# Patient Record
Sex: MALE | Race: WHITE | Employment: OTHER | URBAN - METROPOLITAN AREA
[De-identification: names, ages, dates, MRNs, and addresses within clinical notes are randomized per-mention and may not be internally consistent; named-entity substitution may affect disease eponyms.]

---

## 2017-02-16 RX ORDER — SIMVASTATIN 10 MG/1
TABLET, FILM COATED ORAL
Qty: 30 TAB | Refills: 10 | Status: SHIPPED | OUTPATIENT
Start: 2017-02-16 | End: 2018-02-20 | Stop reason: SDUPTHER

## 2017-03-20 ENCOUNTER — OFFICE VISIT (OUTPATIENT)
Dept: CARDIOLOGY CLINIC | Age: 82
End: 2017-03-20

## 2017-03-20 ENCOUNTER — HOSPITAL ENCOUNTER (OUTPATIENT)
Dept: LAB | Age: 82
Discharge: HOME | End: 2017-03-20
Payer: MEDICARE

## 2017-03-20 VITALS
OXYGEN SATURATION: 97 % | SYSTOLIC BLOOD PRESSURE: 150 MMHG | WEIGHT: 168.4 LBS | RESPIRATION RATE: 16 BRPM | HEIGHT: 68 IN | HEART RATE: 85 BPM | DIASTOLIC BLOOD PRESSURE: 80 MMHG | BODY MASS INDEX: 25.52 KG/M2

## 2017-03-20 DIAGNOSIS — R06.02 SHORTNESS OF BREATH: ICD-10-CM

## 2017-03-20 DIAGNOSIS — I25.10 CORONARY ARTERY DISEASE INVOLVING NATIVE CORONARY ARTERY OF NATIVE HEART WITHOUT ANGINA PECTORIS: Primary | ICD-10-CM

## 2017-03-20 DIAGNOSIS — J44.9 CHRONIC OBSTRUCTIVE PULMONARY DISEASE, UNSPECIFIED COPD TYPE (HCC): ICD-10-CM

## 2017-03-20 DIAGNOSIS — Z01.810 PREOP CARDIOVASCULAR EXAM: ICD-10-CM

## 2017-03-20 DIAGNOSIS — E78.2 MIXED HYPERLIPIDEMIA: ICD-10-CM

## 2017-03-20 PROCEDURE — 36415 COLL VENOUS BLD VENIPUNCTURE: CPT

## 2017-03-20 PROCEDURE — 80053 COMPREHEN METABOLIC PANEL: CPT

## 2017-03-20 PROCEDURE — 85730 THROMBOPLASTIN TIME PARTIAL: CPT

## 2017-03-20 PROCEDURE — 85610 PROTHROMBIN TIME: CPT

## 2017-03-20 PROCEDURE — 85027 COMPLETE CBC AUTOMATED: CPT

## 2017-03-20 PROCEDURE — 80061 LIPID PANEL: CPT

## 2017-03-20 NOTE — LETTER
3/21/2017 10:25 AM 
 
Patient:  Bonnie Alexander YOB: 1931 Date of Visit: 3/20/2017 Dear Nae Mueller MD 
[facility] [address] [facility] 7 [address] VIA Facsimile: [phone] Lady Padma MD 
[address] [facility] [address] VIA In Basket 
 : 
Mr. Abhijit Vega is a 81 yo M with CAD s/p cath on 3/19/10 with multivessel CAD, then 5 vessel CABG on 3/23/10 (LIMA to LAD, SVG to D1, OM2, OM3, and SVG to RCA), EF 60% by echo 2010, hyperlipidemia (intolerance to lipitor) on crestor, asthma, DJD of lumbar spine, sciatica on right side followed by orthopedics, Dr. Chance Duffy. Since his last visit, he has been doing well here. He is here for preop cardiac evaluation prior to surgery on his right hip on 03/27/2017. Since his last visit, he has been doing well. He denies any chest pain. He has some baseline shortness of breath unchanged, which he attributes to his COPD. No palpitations. No syncope. His echocardiogram last year demonstrated normal systolic function and mild aortic regurgitation. He is compensated on exam with clear lungs and no lower extremity edema. 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  
Assessment and Plan: 1. Preop cardiac evaluation. He is stable and compensated from a cardiac standpoint and low risk for cardiac complications. No additional cardiac evaluation is indicated at this time. He can hold his aspirin as needed. 2. Coronary artery disease, status post bypass. Continue beta-blocker, statin and aspirin. Will obtain blood work today. He is not on an ACE inhibitor, but could consider this in the future, depending on what his blood pressure is. 3. Essential hypertension. Blood pressure is overall controlled. He has some white coat hypertension. 4. Mixed hyperlipidemia. Muscle aches with Lipitor and Crestor. Doing well on Zocor. 5. Dizziness. It has been intermittent.   Workup has been unrevealing including neurology with MRI and ENT with Dr. Mendoza Estimable. Dr. Tiffany Chavis is going to do his hip surgery. If you have questions, please do not hesitate to call me. Sincerely, Raciel Kaiser MD

## 2017-03-20 NOTE — MR AVS SNAPSHOT
Visit Information Date & Time Provider Department Dept. Phone Encounter #  
 3/20/2017  9:20 AM Louvella Goldmann, MD [facility] [phone] [id number] Your Appointments 4/20/2017 10:00 AM  
ESTABLISHED PATIENT with Louvella Goldmann, MD  
[facility] ([address]) Appt Note: 6 month follow up  
 [address]  
[address] [facility] 7 [address] Upcoming Health Maintenance Date Due DTaP/Tdap/Td series (1 - Tdap) 1/5/1952 ZOSTER VACCINE AGE 60> 1/5/1991 GLAUCOMA SCREENING Q2Y 1/5/1996 MEDICARE YEARLY EXAM 1/5/1996 INFLUENZA AGE 9 TO ADULT 8/1/2016 Allergies as of 3/20/2017  Review Complete On: 3/20/2017 By: Louvella Goldmann, MD  
  
 Severity Noted Reaction Type Reactions Lipitor [Atorvastatin]  06/02/2009    Other (comments) Causes g.i. upset Current Immunizations  Never Reviewed Name Date Influenza Vaccine Whole 9/23/2005 Pneumococcal Vaccine (Unspecified Type) 9/23/2005, 7/1/1999 Not reviewed this visit You Were Diagnosed With   
  
 Codes Comments Coronary artery disease involving native coronary artery of native heart without angina pectoris    -  Primary ICD-10-CM: I25.10 ICD-9-CM: 414.01 Mixed hyperlipidemia     ICD-10-CM: E78.2 ICD-9-CM: 272.2 Shortness of breath     ICD-10-CM: R06.02 
ICD-9-CM: 786.05 Vitals BP Pulse Resp Height(growth percentile) Weight(growth percentile) SpO2  
 150/80 (BP 1 Location: Left arm, BP Patient Position: Sitting) 85 16 5' 8\" (1.727 m) 168 lb 6.4 oz (76.4 kg) 97% BMI Smoking Status 25.61 kg/m2 Never Smoker BMI and BSA Data Body Mass Index Body Surface Area  
 25.61 kg/m 2 1.91 m 2 Preferred Pharmacy Pharmacy Name Phone LAUR[facility] [address] [facility] [place][note 2] [phone] Your Updated Medication List  
  
   
This list is accurate as of: 3/20/17 10:23 AM.  Always use your most recent med list.  
  
  
  
  
 albuterol 90 mcg/actuation inhaler Commonly known as:  PROVENTIL HFA, VENTOLIN HFA, PROAIR HFA Take 1 Puff by inhalation two (2) times daily as needed for Wheezing. aspirin 81 mg chewable tablet Take 1 Tab by mouth daily. QVAR 40 mcg/actuation [employer] Generic drug:  beclomethasone Take 2 Puffs by inhalation two (2) times a day. simvastatin 10 mg tablet Commonly known as:  ZOCOR  
TAKE ONE TABLET BY MOUTH NIGHTLY We Performed the Following AMB POC EKG ROUTINE W/ 12 LEADS, INTER & REP [96910 CPT(R)] CBC W/O DIFF [60428 CPT(R)] LIPID PANEL [26986 CPT(R)] METABOLIC PANEL, COMPREHENSIVE [78667 CPT(R)] PROTHROMBIN TIME + INR [52370 CPT(R)] PTT [id number] CPT(R)] Introducing [facility]! Jessie Cyr introduces [employer] patient portal. Now you can access parts of your medical record, email your doctor's office, and request medication refills online. 1. In your internet browser, go to [email or web address] 2. Click on the First Time User? Click Here link in the Sign In box. You will see the New Member Sign Up page. 3. Enter your [employer] Access Code exactly as it appears below. You will not need to use this code after youve completed the sign-up process. If you do not sign up before the expiration date, you must request a new code. · [employer] Access Code: [email or web address] Expires: 6/18/2017 10:22 AM 
 
4. Enter the last four digits of your Social Security Number (xxxx) and Date of Birth (mm/dd/yyyy) as indicated and click Submit. You will be taken to the next sign-up page. 5. Create a [employer] ID. This will be your [employer] login ID and cannot be changed, so think of one that is secure and easy to remember. 6. Create a [employer] password. You can change your password at any time. 7. Enter your Password Reset Question and Answer. This can be used at a later time if you forget your password. 8. Enter your e-mail address. You will receive e-mail notification when new information is available in [address] 9. Click Sign Up. You can now view and download portions of your medical record. 10. Click the Download Summary menu link to download a portable copy of your medical information. If you have questions, please visit the Frequently Asked Questions section of the [employer] website. Remember, [employer] is NOT to be used for urgent needs. For medical emergencies, dial 911. Now available from your iPhone and Android! Please provide this summary of care documentation to your next provider. Your primary care clinician is listed as Anthony Schrader. If you have any questions after today's visit, please call [phone].

## 2017-03-20 NOTE — PROGRESS NOTES
[address]: Arthur Harrison  [phone]    HPI:  Mr. Agatha Solitario is a 79 yo M with CAD s/p cath on 3/19/10 with multivessel CAD, then 5 vessel CABG on 3/23/10 (LIMA to LAD, SVG to D1, OM2, OM3, and SVG to RCA), EF 60% by echo 2010, hyperlipidemia (intolerance to lipitor) on crestor, asthma, DJD of lumbar spine, sciatica on right side followed by orthopedics, Dr. Anabell Mera. Since his last visit, he has been doing well here. He is here for preop cardiac evaluation prior to surgery on his right hip on 03/27/2017. Since his last visit, he has been doing well. He denies any chest pain. He has some baseline shortness of breath unchanged, which he attributes to his COPD. No palpitations. No syncope. His echocardiogram last year demonstrated normal systolic function and mild aortic regurgitation. He is compensated on exam with clear lungs and no lower extremity edema. 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   Assessment and Plan:   1. Preop cardiac evaluation. He is stable and compensated from a cardiac standpoint and low risk for cardiac complications. No additional cardiac evaluation is indicated at this time. He can hold his aspirin as needed. 2. Coronary artery disease, status post bypass. Continue beta-blocker, statin and aspirin. Will obtain blood work today. He is not on an ACE inhibitor, but could consider this in the future, depending on what his blood pressure is. 3. Essential hypertension. Blood pressure is overall controlled. He has some white coat hypertension. 4. Mixed hyperlipidemia. Muscle aches with Lipitor and Crestor. Doing well on Zocor. 5. Dizziness. It has been intermittent. Workup has been unrevealing including neurology with MRI and ENT with Dr. Anabell Mera. Dr. Priyanka Garber is going to do his hip surgery. He  has a past medical history of CAD (coronary artery disease); Essential hypertension; and Hyperlipidemia. All other systems negative except as above. PE  Vitals:    03/20/17 1000   BP: 150/80   Pulse: 85   Resp: 16   SpO2: 97%   Weight: 168 lb 6.4 oz (76.4 kg)   Height: 5' 8\" (1.727 m)    Body mass index is 25.61 kg/(m^2).    General appearance - alert, well appearing, and in no distress  Mental status - affect appropriate to mood  Neck - supple, no JVD  Chest - clear to auscultation, no wheezes, rales or rhonchi  Heart - normal rate, regular rhythm, normal S1, S2, I-II/VI systolic murmur LUSB  Abdomen - soft, nontender, nondistended  Extremities - peripheral pulses normal, no pedal edema  Recent Labs:  Lab Results   Component Value Date/Time    Cholesterol, total 197 07/29/2016 10:43 AM    HDL Cholesterol 52 07/29/2016 10:43 AM    LDL, calculated 123 07/29/2016 10:43 AM    Triglyceride 110 07/29/2016 10:43 AM    CHOL/HDL Ratio 3.7 09/16/2010 11:40 AM     Lab Results   Component Value Date/Time    Creatinine 0.87 02/22/2016 10:39 AM     Lab Results   Component Value Date/Time    BUN 17 02/22/2016 10:39 AM     Lab Results   Component Value Date/Time    Potassium 4.6 02/22/2016 10:39 AM     Lab Results   Component Value Date/Time    Hemoglobin A1c 5.8 03/20/2010 04:10 AM     Lab Results   Component Value Date/Time    HGB 13.3 12/27/2012 10:36 AM     Lab Results   Component Value Date/Time    PLATELET 302 89/24/9753 10:36 AM       Reviewed:  Past Medical History:   Diagnosis Date    CAD (coronary artery disease)     Essential hypertension     Hyperlipidemia      History   Smoking Status    Never Smoker   Smokeless Tobacco    Never Used     History   Alcohol Use No     Comment: occasional drink at night 2-3 times per week     Allergies   Allergen Reactions    Lipitor [Atorvastatin] Other (comments)     Causes g.i. upset       Current Outpatient Prescriptions   Medication Sig    simvastatin (ZOCOR) 10 mg tablet TAKE ONE TABLET BY MOUTH NIGHTLY    albuterol (PROVENTIL HFA, VENTOLIN HFA, PROAIR HFA) 90 mcg/actuation inhaler Take 1 Puff by inhalation two (2) times daily as needed for Wheezing.  beclomethasone (QVAR) 40 mcg/actuation inhaler Take 2 Puffs by inhalation two (2) times a day.  aspirin 81 mg chewable tablet Take 1 Tab by mouth daily. No current facility-administered medications for this visit.         Radha Ballesteros MD  [facility] [place]  [address] [facility] [address]  [address]

## 2017-03-21 LAB
ALBUMIN SERPL-MCNC: 3.7 G/DL (ref 3.5–4.7)
ALBUMIN/GLOB SERPL: 1.6 {RATIO} (ref 1.2–2.2)
ALP SERPL-CCNC: 49 IU/L (ref 39–117)
ALT SERPL-CCNC: 9 IU/L (ref 0–44)
APTT PPP: 27 SEC (ref 24–33)
AST SERPL-CCNC: 17 IU/L (ref 0–40)
BILIRUB SERPL-MCNC: 0.5 MG/DL (ref 0–1.2)
BUN SERPL-MCNC: 19 MG/DL (ref 8–27)
BUN/CREAT SERPL: 22 (ref 10–22)
CALCIUM SERPL-MCNC: 8.8 MG/DL (ref 8.6–10.2)
CHLORIDE SERPL-SCNC: 102 MMOL/L (ref 96–106)
CHOLEST SERPL-MCNC: 194 MG/DL (ref 100–199)
CO2 SERPL-SCNC: 26 MMOL/L (ref 18–29)
CREAT SERPL-MCNC: 0.87 MG/DL (ref 0.76–1.27)
ERYTHROCYTE [DISTWIDTH] IN BLOOD BY AUTOMATED COUNT: 14.2 % (ref 12.3–15.4)
GLOBULIN SER CALC-MCNC: 2.3 G/DL (ref 1.5–4.5)
GLUCOSE SERPL-MCNC: 98 MG/DL (ref 65–99)
HCT VFR BLD AUTO: 41.9 % (ref 37.5–51)
HDLC SERPL-MCNC: 62 MG/DL
HGB BLD-MCNC: 13.2 G/DL (ref 12.6–17.7)
INR PPP: 1 (ref 0.8–1.2)
INTERPRETATION, 910389: NORMAL
LDLC SERPL CALC-MCNC: 114 MG/DL (ref 0–99)
MCH RBC QN AUTO: 27 PG (ref 26.6–33)
MCHC RBC AUTO-ENTMCNC: 31.5 G/DL (ref 31.5–35.7)
MCV RBC AUTO: 86 FL (ref 79–97)
PLATELET # BLD AUTO: 138 X10E3/UL (ref 150–379)
POTASSIUM SERPL-SCNC: 4.6 MMOL/L (ref 3.5–5.2)
PROT SERPL-MCNC: 6 G/DL (ref 6–8.5)
PROTHROMBIN TIME: 10.4 SEC (ref 9.1–12)
RBC # BLD AUTO: 4.89 X10E6/UL (ref 4.14–5.8)
SODIUM SERPL-SCNC: 142 MMOL/L (ref 134–144)
TRIGL SERPL-MCNC: 91 MG/DL (ref 0–149)
VLDLC SERPL CALC-MCNC: 18 MG/DL (ref 5–40)
WBC # BLD AUTO: 4.8 X10E3/UL (ref 3.4–10.8)

## 2017-09-18 ENCOUNTER — OFFICE VISIT (OUTPATIENT)
Dept: CARDIOLOGY CLINIC | Age: 82
End: 2017-09-18

## 2017-09-18 VITALS
WEIGHT: 162.5 LBS | BODY MASS INDEX: 24.71 KG/M2 | SYSTOLIC BLOOD PRESSURE: 134 MMHG | HEART RATE: 64 BPM | DIASTOLIC BLOOD PRESSURE: 78 MMHG

## 2017-09-18 DIAGNOSIS — J44.9 CHRONIC OBSTRUCTIVE PULMONARY DISEASE, UNSPECIFIED COPD TYPE (HCC): ICD-10-CM

## 2017-09-18 DIAGNOSIS — I10 BENIGN ESSENTIAL HTN: ICD-10-CM

## 2017-09-18 DIAGNOSIS — I25.10 CORONARY ARTERY DISEASE INVOLVING NATIVE CORONARY ARTERY OF NATIVE HEART WITHOUT ANGINA PECTORIS: Primary | ICD-10-CM

## 2017-09-18 DIAGNOSIS — E78.2 MIXED HYPERLIPIDEMIA: ICD-10-CM

## 2017-09-18 NOTE — PROGRESS NOTES
[address]: Milana Kitchen  [phone]    HPI:  Mr. Josafat Sandoval is a 81 yo M with CAD s/p cath on 3/19/10 with multivessel CAD, then 5 vessel CABG on 3/23/10 (LIMA to LAD, SVG to D1, OM2, OM3, and SVG to RCA), EF 60% by echo 2010, hyperlipidemia (intolerance to lipitor) on crestor, asthma, DJD of lumbar spine, sciatica on right side followed by orthopedics, Dr. Elvira Bauer; s/p surgery on his right hip on 03/27/2017. Since his last visit, he continues to do well. He did have surgery on his right hip that went very smoothly. He is back to [facility]. He denies any chest pain, shortness of breath, palpitations. He has been compliant with his medications. Blood pressure is 134/78 with a heart rate of 64. He has lost some weight by watching what he takes in and his weight is down from 168 to 162 pounds. Assessment and Plan:   1. Coronary artery disease, status post bypass. Continue beta-blocker, statin and aspirin. He had blood work last time and will get this once a year. He is not on an ACE inhibitor and could consider this in the future if needed for blood pressure. 2. Essential hypertension. Blood pressure is controlled and no changes made. History of white coat hypertension. 3. Mixed hyperlipidemia. Had muscle aches with Lipitor and Crestor. Doing well on Zocor. 4. Dizziness. His workup with neurology and ENT have been unrevealing and this is much improved. He  has a past medical history of CAD (coronary artery disease); Essential hypertension; and Hyperlipidemia. All other systems negative except as above. PE  Vitals:    09/18/17 0928   BP: 134/78   Pulse: 64   Weight: 162 lb 8 oz (73.7 kg)    Body mass index is 24.71 kg/(m^2).    General appearance - alert, well appearing, and in no distress  Mental status - affect appropriate to mood  Neck - supple, no JVD  Chest - clear to auscultation, no wheezes, rales or rhonchi  Heart - normal rate, regular rhythm, normal S1, S2, I-II/VI systolic murmur LUSB  Abdomen - soft, nontender, nondistended  Extremities - peripheral pulses normal, no pedal edema  Recent Labs:  Lab Results   Component Value Date/Time    Cholesterol, total 194 03/20/2017 11:04 AM    HDL Cholesterol 62 03/20/2017 11:04 AM    LDL, calculated 114 03/20/2017 11:04 AM    Triglyceride 91 03/20/2017 11:04 AM    CHOL/HDL Ratio 3.7 09/16/2010 11:40 AM     Lab Results   Component Value Date/Time    Creatinine 0.87 03/20/2017 11:04 AM     Lab Results   Component Value Date/Time    BUN 19 03/20/2017 11:04 AM     Lab Results   Component Value Date/Time    Potassium 4.6 03/20/2017 11:04 AM     Lab Results   Component Value Date/Time    Hemoglobin A1c 5.8 03/20/2010 04:10 AM     Lab Results   Component Value Date/Time    HGB 13.2 03/20/2017 11:04 AM     Lab Results   Component Value Date/Time    PLATELET 104 79/00/8954 11:04 AM       Reviewed:  Past Medical History:   Diagnosis Date    CAD (coronary artery disease)     Essential hypertension     Hyperlipidemia      History   Smoking Status    Never Smoker   Smokeless Tobacco    Never Used     History   Alcohol Use No     Comment: occasional drink at night 2-3 times per week     Allergies   Allergen Reactions    Lipitor [Atorvastatin] Other (comments)     Causes g.i. upset       Current Outpatient Prescriptions   Medication Sig    simvastatin (ZOCOR) 10 mg tablet TAKE ONE TABLET BY MOUTH NIGHTLY    albuterol (PROVENTIL HFA, VENTOLIN HFA, PROAIR HFA) 90 mcg/actuation inhaler Take 1 Puff by inhalation two (2) times daily as needed for Wheezing.  beclomethasone (QVAR) 40 mcg/actuation inhaler Take 2 Puffs by inhalation two (2) times a day.  aspirin 81 mg chewable tablet Take 1 Tab by mouth daily. No current facility-administered medications for this visit.         MD Cortes Fontenot [facility] [place]  [address] [facility] [address]  [address]

## 2018-03-29 ENCOUNTER — OFFICE VISIT (OUTPATIENT)
Dept: CARDIOLOGY CLINIC | Age: 83
End: 2018-03-29

## 2018-03-29 VITALS
BODY MASS INDEX: 25.19 KG/M2 | RESPIRATION RATE: 18 BRPM | HEART RATE: 71 BPM | WEIGHT: 166.2 LBS | DIASTOLIC BLOOD PRESSURE: 68 MMHG | OXYGEN SATURATION: 97 % | SYSTOLIC BLOOD PRESSURE: 118 MMHG | HEIGHT: 68 IN

## 2018-03-29 DIAGNOSIS — R42 DIZZINESS: ICD-10-CM

## 2018-03-29 DIAGNOSIS — I25.10 CORONARY ARTERY DISEASE INVOLVING NATIVE CORONARY ARTERY OF NATIVE HEART WITHOUT ANGINA PECTORIS: Primary | ICD-10-CM

## 2018-03-29 DIAGNOSIS — I10 BENIGN ESSENTIAL HTN: ICD-10-CM

## 2018-03-29 DIAGNOSIS — E78.2 MIXED HYPERLIPIDEMIA: ICD-10-CM

## 2018-03-29 NOTE — LETTER
3/29/2018 5:25 PM 
 
Patient:  Latrice Aguiar YOB: 1931 Date of Visit: 3/29/2018 Dear Aime Walter MD 
[facility] [address] [facility] 7 [address] VIA Facsimile: [phone] 
 : 
 
Mr. Rosey Torres is a 79 yo M with CAD s/p cath on 3/19/10 with multivessel CAD, then 5 vessel CABG on 3/23/10 (LIMA to LAD, SVG to D1, OM2, OM3, and SVG to RCA), EF 60% by echo 2010, hyperlipidemia (intolerance to lipitor) on crestor, asthma, DJD of lumbar spine, sciatica on right side followed by orthopedics, Dr. Sharita Kearns; s/p surgery on his right hip on 03/27/2017. Since his last visit, he continues to do well. He denies any chest pain, shortness of breath or palpitations. He has been compliant with his medications. Blood pressure is 118/68 with a heart rate of 71. He is going to see his son in [place] coming up. EKG is unchanged, normal sinus rhythm, nonspecific ST-T wave abnormality, heart rate of 71, left axis deviation. Assessment and Plan: 1. Coronary artery disease, status post bypass. Stable and compensated. Continue beta-blocker, aspirin and statin. He is not on beta-blocker or ACE inhibitor due to low normal blood pressure. 2. Essential hypertension. History of white coat hypertension. Blood pressure is controlled without beta-blocker or ACE inhibitor. 3. Mixed hyperlipidemia. Muscle aches with Lipitor and Crestor. Doing well on low dose Zocor. 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 4. Dizziness. Followed by neurology and ENT. If you have questions, please do not hesitate to call me. Sincerely, Jillian Davison MD

## 2018-03-29 NOTE — PROGRESS NOTES
[address]: Negra Chowdary  [phone]    HPI:  Mr. Robb Hathaway is a 79 yo M with CAD s/p cath on 3/19/10 with multivessel CAD, then 5 vessel CABG on 3/23/10 (LIMA to LAD, SVG to D1, OM2, OM3, and SVG to RCA), EF 60% by echo 2010, hyperlipidemia (intolerance to lipitor) on crestor, asthma, DJD of lumbar spine, sciatica on right side followed by orthopedics, Dr. Rachel Ann; s/p surgery on his right hip on 03/27/2017. Since his last visit, he continues to do well. He denies any chest pain, shortness of breath or palpitations. He has been compliant with his medications. Blood pressure is 118/68 with a heart rate of 71. He is going to see his son in [place] coming up. EKG is unchanged, normal sinus rhythm, nonspecific ST-T wave abnormality, heart rate of 71, left axis deviation. Assessment and Plan:   1. Coronary artery disease, status post bypass. Stable and compensated. Continue beta-blocker, aspirin and statin. He is not on beta-blocker or ACE inhibitor due to low normal blood pressure. 2. Essential hypertension. History of white coat hypertension. Blood pressure is controlled without beta-blocker or ACE inhibitor. 3. Mixed hyperlipidemia. Muscle aches with Lipitor and Crestor. Doing well on low dose Zocor. 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 4. Dizziness. Followed by neurology and ENT. He  has a past medical history of CAD (coronary artery disease); Essential hypertension; and Hyperlipidemia. All other systems negative except as above. PE  Vitals:    03/29/18 1022   BP: 118/68   Pulse: 71   Resp: 18   SpO2: 97%   Weight: 166 lb 3.2 oz (75.4 kg)   Height: 5' 8\" (1.727 m)    Body mass index is 25.27 kg/(m^2).    General appearance - alert, well appearing, and in no distress  Mental status - affect appropriate to mood  Neck - supple, no JVD  Chest - clear to auscultation, no wheezes, rales or rhonchi  Heart - normal rate, regular rhythm, normal S1, S2, I-II/VI systolic murmur LUSB  Abdomen - soft, nontender, nondistended  Extremities - peripheral pulses normal, no pedal edema  Recent Labs:  Lab Results   Component Value Date/Time    Cholesterol, total 194 03/20/2017 11:04 AM    HDL Cholesterol 62 03/20/2017 11:04 AM    LDL, calculated 114 (H) 03/20/2017 11:04 AM    Triglyceride 91 03/20/2017 11:04 AM    CHOL/HDL Ratio 3.7 09/16/2010 11:40 AM     Lab Results   Component Value Date/Time    Creatinine 0.87 03/20/2017 11:04 AM     Lab Results   Component Value Date/Time    BUN 19 03/20/2017 11:04 AM     Lab Results   Component Value Date/Time    Potassium 4.6 03/20/2017 11:04 AM     Lab Results   Component Value Date/Time    Hemoglobin A1c 5.8 03/20/2010 04:10 AM     Lab Results   Component Value Date/Time    HGB 13.2 03/20/2017 11:04 AM     Lab Results   Component Value Date/Time    PLATELET 135 (L) 78/30/0561 11:04 AM       Reviewed:  Past Medical History:   Diagnosis Date    CAD (coronary artery disease)     Essential hypertension     Hyperlipidemia      History   Smoking Status    Never Smoker   Smokeless Tobacco    Never Used     History   Alcohol Use No     Comment: occasional drink at night 2-3 times per week     Allergies   Allergen Reactions    Lipitor [Atorvastatin] Other (comments)     Causes g.i. upset       Current Outpatient Prescriptions   Medication Sig    simvastatin (ZOCOR) 10 mg tablet TAKE ONE TABLET BY MOUTH NIGHTLY    albuterol (PROVENTIL HFA, VENTOLIN HFA, PROAIR HFA) 90 mcg/actuation inhaler Take 1 Puff by inhalation two (2) times daily as needed for Wheezing.  beclomethasone (QVAR) 40 mcg/actuation inhaler Take 2 Puffs by inhalation two (2) times a day.  aspirin 81 mg chewable tablet Take 1 Tab by mouth daily. No current facility-administered medications for this visit. Jami Mack MD  [facility] [place]  [address] [facility] [address]  [address]

## 2018-03-29 NOTE — MR AVS SNAPSHOT
[address] [address] [facility] 
[phone] Patient: Dora Coffey MRN: [id number] FOU:[id number]/9/0281 Visit Information Date & Time Provider Department Dept. Phone Encounter #  
 3/29/2018 10:20 AM Larry Veliz MD [facility] Dea Estimable [phone] [id number] Upcoming Health Maintenance Date Due DTaP/Tdap/Td series (1 - Tdap) 1/5/1952 ZOSTER VACCINE AGE 60> 11/5/1990 GLAUCOMA SCREENING Q2Y 1/5/1996 Influenza Age 5 to Adult 8/1/2017 MEDICARE YEARLY EXAM 3/14/2018 Allergies as of 3/29/2018  Review Complete On: 3/29/2018 By: Larry Veliz MD  
  
 Severity Noted Reaction Type Reactions Lipitor [Atorvastatin]  06/02/2009    Other (comments) Causes g.i. upset Current Immunizations  Never Reviewed Name Date Influenza Vaccine Whole 9/23/2005 ZZZ-RETIRED (DO NOT USE) Pneumococcal Vaccine (Unspecified Type) 9/23/2005, 7/1/1999 Not reviewed this visit You Were Diagnosed With   
  
 Codes Comments Coronary artery disease involving native coronary artery of native heart without angina pectoris    -  Primary ICD-10-CM: I25.10 ICD-9-CM: 414.01 Vitals BP Pulse Resp Height(growth percentile) Weight(growth percentile) SpO2  
 118/68 (BP 1 Location: Left arm) 71 18 5' 8\" (1.727 m) 166 lb 3.2 oz (75.4 kg) 97% BMI Smoking Status 25.27 kg/m2 Never Smoker Vitals History BMI and BSA Data Body Mass Index Body Surface Area  
 25.27 kg/m 2 1.9 m 2 Preferred Pharmacy Pharmacy Name Phone [facility] [phone] [phone] Your Updated Medication List  
  
   
This list is accurate as of 3/29/18 10:53 AM.  Always use your most recent med list.  
  
  
  
  
 albuterol 90 mcg/actuation inhaler Commonly known as:  PROVENTIL HFA, VENTOLIN HFA, PROAIR HFA  
 Take 1 Puff by inhalation two (2) times daily as needed for Wheezing. aspirin 81 mg chewable tablet Take 1 Tab by mouth daily. QVAR 40 mcg/actuation [employer] Generic drug:  beclomethasone Take 2 Puffs by inhalation two (2) times a day. simvastatin 10 mg tablet Commonly known as:  ZOCOR  
TAKE ONE TABLET BY MOUTH NIGHTLY We Performed the Following AMB POC EKG ROUTINE W/ 12 LEADS, INTER & REP [02547 CPT(R)] Introducing [facility]! Guy Oliveros introduces [employer] patient portal. Now you can access parts of your medical record, email your doctor's office, and request medication refills online. 1. In your internet browser, go to [email or web address] 2. Click on the First Time User? Click Here link in the Sign In box. You will see the New Member Sign Up page. 3. Enter your [employer] Access Code exactly as it appears below. You will not need to use this code after youve completed the sign-up process. If you do not sign up before the expiration date, you must request a new code. · [employer] Access Code: [email or web address] Expires: 6/27/2018 10:53 AM 
 
4. Enter the last four digits of your Social Security Number (xxxx) and Date of Birth (mm/dd/yyyy) as indicated and click Submit. You will be taken to the next sign-up page. 5. Create a [employer] ID. This will be your [employer] login ID and cannot be changed, so think of one that is secure and easy to remember. 6. Create a [employer] password. You can change your password at any time. 7. Enter your Password Reset Question and Answer. This can be used at a later time if you forget your password. 8. Enter your e-mail address. You will receive e-mail notification when new information is available in [address] 9. Click Sign Up. You can now view and download portions of your medical record. 10. Click the Download Summary menu link to download a portable copy of your medical information. If you have questions, please visit the Frequently Asked Questions section of the [employer] website. Remember, [employer] is NOT to be used for urgent needs. For medical emergencies, dial 911. Now available from your iPhone and Android! Please provide this summary of care documentation to your next provider. Your primary care clinician is listed as Parvez Sumner. If you have any questions after today's visit, please call [phone].

## 2018-03-30 ENCOUNTER — TELEPHONE (OUTPATIENT)
Dept: CARDIOLOGY CLINIC | Age: 83
End: 2018-03-30

## 2018-03-30 NOTE — TELEPHONE ENCOUNTER
Patient was calling to give the information of the veterans dr he sees.  Dr Mike Parr   phone(per pt): [phone] x [phone]  Phone: [phone]

## 2018-03-30 NOTE — TELEPHONE ENCOUNTER
Dr. Dodge Reading Fax # [phone]  Office # [phone] x [phone]    Recent office note sent to above physician.

## 2018-10-17 ENCOUNTER — OFFICE VISIT (OUTPATIENT)
Dept: CARDIOLOGY CLINIC | Age: 83
End: 2018-10-17

## 2018-10-17 VITALS
BODY MASS INDEX: 24.98 KG/M2 | WEIGHT: 164.8 LBS | RESPIRATION RATE: 16 BRPM | SYSTOLIC BLOOD PRESSURE: 134 MMHG | HEART RATE: 78 BPM | HEIGHT: 68 IN | DIASTOLIC BLOOD PRESSURE: 80 MMHG

## 2018-10-17 DIAGNOSIS — E78.2 MIXED HYPERLIPIDEMIA: ICD-10-CM

## 2018-10-17 DIAGNOSIS — R00.2 HEART PALPITATIONS: Primary | ICD-10-CM

## 2018-10-17 DIAGNOSIS — I25.10 CORONARY ARTERY DISEASE INVOLVING NATIVE CORONARY ARTERY OF NATIVE HEART WITHOUT ANGINA PECTORIS: ICD-10-CM

## 2018-10-17 DIAGNOSIS — J44.9 CHRONIC OBSTRUCTIVE PULMONARY DISEASE, UNSPECIFIED COPD TYPE (HCC): ICD-10-CM

## 2018-10-17 DIAGNOSIS — I10 BENIGN ESSENTIAL HTN: ICD-10-CM

## 2018-10-17 NOTE — PROGRESS NOTES
[facility]: Gt Garcia  [phone]    HPI:  Mr. Nara Hayes is a 79 yo M with CAD s/p cath on 3/19/10 with multivessel CAD, then 5 vessel CABG on 3/23/10 (LIMA to LAD, SVG to D1, OM2, OM3, and SVG to RCA), EF 60% by echo 2010, hyperlipidemia (intolerance to lipitor) on crestor, asthma, DJD of lumbar spine, sciatica on right side followed by orthopedics, Dr. Shaheen Barcenas; s/p surgery on his right hip on 03/27/2017. Since his last visit, he has been doing well. He denies any chest pain. He does get some shortness of breath that he attributes to his COPD and he has had some increased congestion. Mostly though he has noticed that he has developed palpitations that occur daily that can last for minutes to hours at a time. Usually, there is no associated lightheadedness or dizziness. He continues to bowl two times a week. He is compensated on exam, but does have scattered rhonchi and mild expiratory wheezing in his lungs. Blood pressure was 134/80.    11/13/18 discussed holter monitor; frequent but benign PACs. No afib. He is not very symptomatic with these and no therapy indicated. Assessment and Plan:   1. Palpitations. Will obtain a 24 hour Holter for further evaluation. He says his daughter has atrial fibrillation, so I wonder if he may have a familial predisposition. He does not have documented afib. 2. Coronary artery disease, status post bypass. Stable and compensated. Continue aspirin and statin. He is not on a beta-blocker or ACE inhibitor due to low normal blood pressure. 3. Mixed hyperlipidemia. Muscle aches with Lipitor and Crestor, but doing well on low dose Zocor. 4. Dizziness. Followed by neurology and ENT. 5. COPD. He  has a past medical history of CAD (coronary artery disease), Essential hypertension, and Hyperlipidemia. All other systems negative except as above.      PE  Vitals:    10/17/18 1541   BP: 134/80   Pulse: 78   Resp: 16   Weight: 164 lb 12.8 oz (74.8 kg)   Height: 5' 8\" (1.727 m)    Body mass index is 25.06 kg/m².    General appearance - alert, well appearing, and in no distress  Mental status - affect appropriate to mood  Neck - supple, no JVD  Chest - clear to auscultation, no wheezes, rales or rhonchi  Heart - normal rate, regular rhythm, normal S1, S2, I-II/VI systolic murmur LUSB  Abdomen - soft, nontender, nondistended  Extremities - peripheral pulses normal, no pedal edema  Recent Labs:  Lab Results   Component Value Date/Time    Cholesterol, total 194 03/20/2017 11:04 AM    HDL Cholesterol 62 03/20/2017 11:04 AM    LDL, calculated 114 (H) 03/20/2017 11:04 AM    Triglyceride 91 03/20/2017 11:04 AM    CHOL/HDL Ratio 3.7 09/16/2010 11:40 AM     Lab Results   Component Value Date/Time    Creatinine 0.87 03/20/2017 11:04 AM     Lab Results   Component Value Date/Time    BUN 19 03/20/2017 11:04 AM     Lab Results   Component Value Date/Time    Potassium 4.6 03/20/2017 11:04 AM     Lab Results   Component Value Date/Time    Hemoglobin A1c 5.8 03/20/2010 04:10 AM     Lab Results   Component Value Date/Time    HGB 13.2 03/20/2017 11:04 AM     Lab Results   Component Value Date/Time    PLATELET 429 (L) 10/39/9645 11:04 AM       Reviewed:  Past Medical History:   Diagnosis Date    CAD (coronary artery disease)     Essential hypertension     Hyperlipidemia      Social History     Tobacco Use   Smoking Status Never Smoker   Smokeless Tobacco Never Used     Social History     Substance and Sexual Activity   Alcohol Use Yes    Alcohol/week: 0.0 oz    Comment: occasional drink at night 2-3 times per week     Allergies   Allergen Reactions    Lipitor [Atorvastatin] Other (comments)     Causes g.i. upset       Current Outpatient Medications   Medication Sig    simvastatin (ZOCOR) 10 mg tablet TAKE ONE TABLET BY MOUTH NIGHTLY    albuterol (PROVENTIL HFA, VENTOLIN HFA, PROAIR HFA) 90 mcg/actuation inhaler Take 1 Puff by inhalation two (2) times daily as needed for Wheezing.  beclomethasone (QVAR) 40 mcg/actuation inhaler Take 2 Puffs by inhalation two (2) times a day.  aspirin 81 mg chewable tablet Take 1 Tab by mouth daily. No current facility-administered medications for this visit.         Wilman Cheek MD  [facility] [place]  [address] [facility] [address]  [address]

## 2018-10-31 ENCOUNTER — CLINICAL SUPPORT (OUTPATIENT)
Dept: CARDIOLOGY CLINIC | Age: 83
End: 2018-10-31

## 2018-10-31 DIAGNOSIS — E78.2 MIXED HYPERLIPIDEMIA: ICD-10-CM

## 2018-10-31 DIAGNOSIS — R00.2 HEART PALPITATIONS: ICD-10-CM

## 2018-10-31 DIAGNOSIS — I25.10 CORONARY ARTERY DISEASE INVOLVING NATIVE CORONARY ARTERY OF NATIVE HEART WITHOUT ANGINA PECTORIS: ICD-10-CM

## 2018-10-31 DIAGNOSIS — R06.02 SHORTNESS OF BREATH: ICD-10-CM

## 2018-10-31 DIAGNOSIS — I10 BENIGN ESSENTIAL HTN: ICD-10-CM

## 2018-10-31 DIAGNOSIS — J44.9 CHRONIC OBSTRUCTIVE PULMONARY DISEASE, UNSPECIFIED COPD TYPE (HCC): ICD-10-CM

## 2018-11-09 ENCOUNTER — TELEPHONE (OUTPATIENT)
Dept: CARDIOLOGY CLINIC | Age: 83
End: 2018-11-09

## 2018-11-09 NOTE — TELEPHONE ENCOUNTER
Verified patient with two patient identifiers. Spoke with patient and told him result not available yet. - holter result has not read yet by -on his desk.

## 2018-11-13 NOTE — TELEPHONE ENCOUNTER
Dr. Ami Wallace returned patient's call:    11/13/18 discussed holter monitor; frequent but benign PACs. No afib.   He is not very symptomatic with these and no therapy indicated

## 2018-11-13 NOTE — TELEPHONE ENCOUNTER
Pt calling again to get his Holter results. He would like for Dr. Ricki Lucas to give him a call back. He can be reached [phone].     [employer]

## 2019-05-17 ENCOUNTER — TELEPHONE (OUTPATIENT)
Dept: CARDIOLOGY CLINIC | Age: 84
End: 2019-05-17

## 2019-05-17 ENCOUNTER — OFFICE VISIT (OUTPATIENT)
Dept: CARDIOLOGY CLINIC | Age: 84
End: 2019-05-17

## 2019-05-17 VITALS
BODY MASS INDEX: 25.46 KG/M2 | RESPIRATION RATE: 19 BRPM | HEART RATE: 70 BPM | HEIGHT: 68 IN | SYSTOLIC BLOOD PRESSURE: 140 MMHG | WEIGHT: 168 LBS | DIASTOLIC BLOOD PRESSURE: 80 MMHG | OXYGEN SATURATION: 96 %

## 2019-05-17 DIAGNOSIS — I25.118 ATHEROSCLEROSIS OF NATIVE CORONARY ARTERY OF NATIVE HEART WITH OTHER FORM OF ANGINA PECTORIS (HCC): ICD-10-CM

## 2019-05-17 DIAGNOSIS — R07.2 SUBSTERNAL CHEST PAIN: Primary | ICD-10-CM

## 2019-05-17 DIAGNOSIS — E78.2 MIXED HYPERLIPIDEMIA: ICD-10-CM

## 2019-05-17 DIAGNOSIS — I25.110 CORONARY ARTERY DISEASE INVOLVING NATIVE HEART WITH UNSTABLE ANGINA PECTORIS, UNSPECIFIED VESSEL OR LESION TYPE (HCC): ICD-10-CM

## 2019-05-17 DIAGNOSIS — R42 DIZZINESS: ICD-10-CM

## 2019-05-17 DIAGNOSIS — R06.02 SHORTNESS OF BREATH: ICD-10-CM

## 2019-05-17 RX ORDER — ISOSORBIDE MONONITRATE 30 MG/1
30 TABLET, EXTENDED RELEASE ORAL DAILY
Qty: 90 TAB | Refills: 2 | Status: SHIPPED | OUTPATIENT
Start: 2019-05-17 | End: 2020-05-11

## 2019-05-17 NOTE — PROGRESS NOTES
Visit Vitals  /80 (BP 1 Location: Right arm, BP Patient Position: Sitting)   Pulse 70   Resp 19   Ht 5' 8\" (1.727 m)   Wt 168 lb (76.2 kg)   SpO2 96%   BMI 25.54 kg/m²

## 2019-05-17 NOTE — PROGRESS NOTES
[address]: Valentino Castro  [phone]    HPI:  Mr. Sisi Blanton is a 81 yo M with CAD s/p cath on 3/19/10 with multivessel CAD, then 5 vessel CABG on 3/23/10 (LIMA to LAD, SVG to D1, OM2, OM3, and SVG to RCA), preserved EF 60% by echo 2010, hyperlipidemia (intolerance to lipitor) on Simvastatin, asthma, DJD of lumbar spine, sciatica on right side followed by orthopedics, Dr. Patricia Herman; s/p surgery on his right hip on 03/27/2017. HE is currently seen for chest pain. HE reports chest pain for past few days to weeks. Constant with no definite relieving factors, worse with activity at times, reported all across precordium and associated with dyspnea. NO relief with s/l nitroglycerine this AM at PCP office. Pain different from MI pain in 2010. Continues to have stable palpitations and dyspnea on exertion. Mild leg edema stable. No obvious distress. Assessment and Plan:     1. Coronary artery disease, status post bypass. Reports new onset chest pain suggestive of atypical angina. Alternatively could be non cardiac in etiology given prolonged nature without resolution. ECG unchanged. Echo with no WMA. Offered stress test but he wants to wait and try meds and see . Recommend initiation of Imdur 30 mg for trial as anti-anginal. Review symptoms on upcoming visit with . With normal Ef at this age, he should have good intermediate term survival.  2. Mixed hyperlipidemia. Muscle aches with Lipitor and Crestor, on low dose Zocor. May cause chest muscle pain at times. 3. Dizziness. Followed by neurology and ENT. 4. COPD. Echo with near normal LA pressure suggests DAV[place] is from COPD and not cardiac in etiology  5. Palpitations. Will obtain a 24 hour Holter for further evaluation. He says his daughter has atrial fibrillation, so I wonder if he may have a familial predisposition. He does not have documented afib.     He  has a past medical history of CAD (coronary artery disease), Essential hypertension, and Hyperlipidemia. Intolerance to high intensity statins is reported. Reports leg edema on prolonged dependence, dyspnea and palpitations. All other systems negative except as above. ECG: NSR, SFB, NSTT    Echo: Performed and reviewed today. Normal LV function. No WMA. No major valvular issues. Normal diastolic function for age without increased LA pressure. PE  Vitals:    05/17/19 1059   BP: 140/80   Pulse: 70   Resp: 19   SpO2: 96%   Weight: 168 lb (76.2 kg)   Height: 5' 8\" (1.727 m)    Body mass index is 25.54 kg/m². /80 (BP 1 Location: Right arm, BP Patient Position: Sitting)   Pulse 70   Resp 19   Ht 5' 8\" (1.727 m)   Wt 168 lb (76.2 kg)   SpO2 96%   BMI 25.54 kg/m²   General:    Alert, cooperative, no distress. Psychiatric:    Normal Mood and affect    Eye/ENT:      Pupils equal, No asymmetry, Conjunctival pink. Able to hear voice at normal amplitude   Lungs:      Visibly symmetric chest expansion, No palpable tenderness. Clear to auscultation bilaterally. Heart[de-identified]    Regular rate and rhythm, S1, S2 normal, no click, rub or gallop. No JVD, Normal palpable peripheral pulses. No cyanosis. MSM III/VI at SB   Abdomen:     Soft, non-tender. Bowel sounds normal. No masses,  No      organomegaly. Extremities:   Extremities normal, atraumatic, mild edema. Neurologic:   CN II-XII grossly intact.  No gross focal deficits         Recent Labs:  Lab Results   Component Value Date/Time    Cholesterol, total 194 03/20/2017 11:04 AM    HDL Cholesterol 62 03/20/2017 11:04 AM    LDL, calculated 114 (H) 03/20/2017 11:04 AM    Triglyceride 91 03/20/2017 11:04 AM    CHOL/HDL Ratio 3.7 09/16/2010 11:40 AM     Lab Results   Component Value Date/Time    Creatinine 0.87 03/20/2017 11:04 AM     Lab Results   Component Value Date/Time    BUN 19 03/20/2017 11:04 AM     Lab Results   Component Value Date/Time    Potassium 4.6 03/20/2017 11:04 AM     Lab Results   Component Value Date/Time Hemoglobin A1c 5.8 03/20/2010 04:10 AM     Lab Results   Component Value Date/Time    HGB 13.2 03/20/2017 11:04 AM     Lab Results   Component Value Date/Time    PLATELET 585 (L) 71/31/7443 11:04 AM       Reviewed:  Past Medical History:   Diagnosis Date    CAD (coronary artery disease)     Essential hypertension     Hyperlipidemia      Social History     Tobacco Use   Smoking Status Never Smoker   Smokeless Tobacco Never Used     Social History     Substance and Sexual Activity   Alcohol Use Yes    Alcohol/week: 0.0 oz    Comment: occasional drink at night 2-3 times per week     Allergies   Allergen Reactions    Lipitor [Atorvastatin] Other (comments)     Causes g.i. upset       Current Outpatient Medications   Medication Sig    simvastatin (ZOCOR) 10 mg tablet TAKE ONE TABLET BY MOUTH EVERY NIGHT AT BEDTIME    albuterol (PROVENTIL HFA, VENTOLIN HFA, PROAIR HFA) 90 mcg/actuation inhaler Take 1 Puff by inhalation two (2) times daily as needed for Wheezing.  beclomethasone (QVAR) 40 mcg/actuation inhaler Take 2 Puffs by inhalation two (2) times a day.  aspirin 81 mg chewable tablet Take 1 Tab by mouth daily. No current facility-administered medications for this visit.         Aida Palumbo MD  [facility] heart and Vascular [place]  [address], [address], [address] [place] [address]

## 2019-05-17 NOTE — TELEPHONE ENCOUNTER
Pt with CABG   Age 80 active  Now with chest pain with exertion   Goes away with rest  For past week  Seeing Dr Mira Grewal in office PCP today and he and I spoke  Given one SL NTG in their office  He will have pt come to our office  May need beta blocker, NTG and set up for cath if pt appropriate candidate  Will add on to Dr Fanny Cohen or available office doc schedule

## 2019-06-11 NOTE — PROGRESS NOTES
[facility] [address]: Vergil Alpers  [phone]    HPI:  Mr. Abhijit Vega is a 81 yo M with CAD s/p cath on 3/19/10 with multivessel CAD, then 5 vessel CABG on 3/23/10 (LIMA to LAD, SVG to D1, OM2, OM3, and SVG to RCA), preserved EF 60% by echo 2010, hyperlipidemia (intolerance to lipitor) on Simvastatin, asthma, DJD of lumbar spine, sciatica on right side followed by orthopedics, Dr. Chance Duffy; s/p surgery on his right hip on 03/27/2017. Over the last few weeks, he has developed chest pain often times with shortness of breath and can be with exertion, but at times not with exertion, sometimes radiating to the back. He saw Dr. Vergil Alpers on 05/17/2019 and at that time the chest discomfort was more atypical and he was started on Imdur. He does not think Imdur has really made a difference. He does feel more progressively short of breath. He has been on his medications. He is compensated on exam with clear lungs. His blood pressure is 125/72. Assessment and Plan:   1. Unstable angina. Worsening chest pain and shortness of breath, concerning for underlying cardiac ischemia and we will proceed with a cardiac catheterization for further evaluation. Discussed the benefits and risks and he is agreeable to proceed. 2. Mixed hyperlipidemia. Muscle aches with Lipitor, Crestor. Doing well on low dose Zocor. 3. Dizziness. Followed by neurology and ENT. 4. COPD.    5. Palpitations. He  has a past medical history of CAD (coronary artery disease), Essential hypertension, and Hyperlipidemia. Intolerance to high intensity statins is reported. Reports leg edema on prolonged dependence, dyspnea and palpitations. All other systems negative except as above. ECG: NSR, SFB, NSTT    Echo: Performed and reviewed today. Normal LV function. No WMA. No major valvular issues. Normal diastolic function for age without increased LA pressure.       PE  Vitals:    06/17/19 0939   BP: 125/72   Pulse: 80   SpO2: 96% Weight: 161 lb (73 kg)   Height: 5' 8\" (1.727 m)    Body mass index is 24.48 kg/m². /72 (BP 1 Location: Right arm, BP Patient Position: Sitting)   Pulse 80   Ht 5' 8\" (1.727 m)   Wt 161 lb (73 kg)   SpO2 96%   BMI 24.48 kg/m²   General:    Alert, cooperative, no distress. Psychiatric:    Normal Mood and affect    Eye/ENT:      Pupils equal, No asymmetry, Conjunctival pink. Able to hear voice at normal amplitude   Lungs:      Visibly symmetric chest expansion, No palpable tenderness. Clear to auscultation bilaterally. Heart[de-identified]    Regular rate and rhythm, S1, S2 normal, no click, rub or gallop. No JVD, Normal palpable peripheral pulses. No cyanosis. [facility]   Abdomen:     Soft, non-tender. Bowel sounds normal. No masses,  No      organomegaly. Extremities:   Extremities normal, atraumatic, mild edema. Neurologic:   CN II-XII grossly intact.  No gross focal deficits         Recent Labs:  Lab Results   Component Value Date/Time    Cholesterol, total 194 03/20/2017 11:04 AM    HDL Cholesterol 62 03/20/2017 11:04 AM    LDL, calculated 114 (H) 03/20/2017 11:04 AM    Triglyceride 91 03/20/2017 11:04 AM    CHOL/HDL Ratio 3.7 09/16/2010 11:40 AM     Lab Results   Component Value Date/Time    Creatinine 0.87 03/20/2017 11:04 AM     Lab Results   Component Value Date/Time    BUN 19 03/20/2017 11:04 AM     Lab Results   Component Value Date/Time    Potassium 4.6 03/20/2017 11:04 AM     Lab Results   Component Value Date/Time    Hemoglobin A1c 5.8 03/20/2010 04:10 AM     Lab Results   Component Value Date/Time    HGB 13.2 03/20/2017 11:04 AM     Lab Results   Component Value Date/Time    PLATELET 410 (L) 39/47/2347 11:04 AM       Reviewed:  Past Medical History:   Diagnosis Date    CAD (coronary artery disease)     Essential hypertension     Hyperlipidemia      Social History     Tobacco Use   Smoking Status Never Smoker   Smokeless Tobacco Never Used     Social History     Substance and Sexual Activity   Alcohol Use Yes    Alcohol/week: 0.0 oz    Comment: occasional drink at night 2-3 times per week     Allergies   Allergen Reactions    Lipitor [Atorvastatin] Other (comments)     Causes g.i. upset       Current Outpatient Medications   Medication Sig    isosorbide mononitrate ER (IMDUR) 30 mg tablet Take 1 Tab by mouth daily for 360 days.  simvastatin (ZOCOR) 10 mg tablet TAKE ONE TABLET BY MOUTH EVERY NIGHT AT BEDTIME    albuterol (PROVENTIL HFA, VENTOLIN HFA, PROAIR HFA) 90 mcg/actuation inhaler Take 1 Puff by inhalation two (2) times daily as needed for Wheezing.  beclomethasone (QVAR) 40 mcg/actuation inhaler Take 2 Puffs by inhalation two (2) times a day.  aspirin 81 mg chewable tablet Take 1 Tab by mouth daily. No current facility-administered medications for this visit.         Belkys Díaz MD  [address] [facility] heart and Vascular [place]  [address] [facility] [address]  [facility], [address]

## 2019-06-17 ENCOUNTER — OFFICE VISIT (OUTPATIENT)
Dept: CARDIOLOGY CLINIC | Age: 84
End: 2019-06-17

## 2019-06-17 VITALS
HEART RATE: 80 BPM | SYSTOLIC BLOOD PRESSURE: 125 MMHG | HEIGHT: 68 IN | WEIGHT: 161 LBS | OXYGEN SATURATION: 96 % | DIASTOLIC BLOOD PRESSURE: 72 MMHG | BODY MASS INDEX: 24.4 KG/M2

## 2019-06-17 DIAGNOSIS — J44.9 CHRONIC OBSTRUCTIVE PULMONARY DISEASE, UNSPECIFIED COPD TYPE (HCC): ICD-10-CM

## 2019-06-17 DIAGNOSIS — I25.700 CORONARY ARTERY DISEASE INVOLVING CORONARY BYPASS GRAFT OF NATIVE HEART WITH UNSTABLE ANGINA PECTORIS (HCC): Primary | ICD-10-CM

## 2019-06-17 DIAGNOSIS — I10 BENIGN ESSENTIAL HTN: ICD-10-CM

## 2019-06-17 DIAGNOSIS — E78.2 MIXED HYPERLIPIDEMIA: ICD-10-CM

## 2019-06-17 RX ORDER — SODIUM CHLORIDE 0.9 % (FLUSH) 0.9 %
5-40 SYRINGE (ML) INJECTION AS NEEDED
Status: CANCELLED | OUTPATIENT
Start: 2019-06-17

## 2019-06-17 RX ORDER — DIPHENHYDRAMINE HYDROCHLORIDE 50 MG/ML
50 INJECTION, SOLUTION INTRAMUSCULAR; INTRAVENOUS
Status: CANCELLED | OUTPATIENT
Start: 2019-06-17 | End: 2019-06-18

## 2019-06-17 RX ORDER — SODIUM CHLORIDE 0.9 % (FLUSH) 0.9 %
5-40 SYRINGE (ML) INJECTION EVERY 8 HOURS
Status: CANCELLED | OUTPATIENT
Start: 2019-06-17

## 2019-06-17 RX ORDER — SODIUM CHLORIDE 9 MG/ML
75 INJECTION, SOLUTION INTRAVENOUS CONTINUOUS
Status: CANCELLED | OUTPATIENT
Start: 2019-06-17

## 2019-06-17 NOTE — PATIENT INSTRUCTIONS
[address] address:  [address] [facility] [address] [facility] [address], [address] [place] [address]    Procedure:Left heart Cath     Your procedure is scheduled for 6/20/19 @ 10:45 am. You need to arrive about 2 hours prior to procedure, so please arrive by 8:45 am to [address] from the [address] parking courtyard entrance. Once inside, go to the left to outpatient registration. Bring your insurance information and list of current medications. You may not have anything to eat or drink after midnight, except for sips of water to take medications. Medication instructions:none     *Have labs drawn prior to procedure (a couple days prior if possible.)  *You will need someone to drive you home after the procedure. Plan to be at [facility] a total of 6-8 hours. *Arrange for a responsible adult to help you at home for at least 24 hours,  *Wear comfortable clothing. Leave jewelry, money, and other valuables at home. You may wear dentures, eyeglasses, and/or hearing aids. *Bring an overnight bag with you (just incase you need to spend the night.)    Post Procedure Instructions:  *No driving for 24 hours post procedure. *No heavy lifting (over 10 lbs) or strenuous activity for 48 hours. *No tub baths, swimming, hot tubs, or spas for 1 week. The band aid over cath site may be removed the day after procedure and site washed gently with soap and water. *The site may appear bruised/ discolored for a couple of weeks. A small knot may be present. You may experience tenderness or soreness in groin area. This may be relieved with the use of Tylenol. *If there is any visible blood at the site, hold pressure for 20 minutes. *Call the office if you should notice numbness, tingling, coldness, or loss of feeling in the area. Call the office if you have a fever within 2-3 days after procedure. Remember, when you begin lifting things, use proper body mechanics and bend with your knees, centering the weight on your legs. Please call with any questions: [phone].  You may also contact the cath lab directly at [phone]

## 2019-06-17 NOTE — LETTER
6/19/2019 8:53 AM 
 
Patient:  Imani Menezes. YOB: 1931 Date of Visit: 6/17/2019 Dear Mariama Juarez MD 
[address] [place] [address] [id number] VIA Facsimile: [phone] 
 : 
Mr. Deni Philip is a 79 yo M with CAD s/p cath on 3/19/10 with multivessel CAD, then 5 vessel CABG on 3/23/10 (LIMA to LAD, SVG to D1, OM2, OM3, and SVG to RCA), preserved EF 60% by echo 2010, hyperlipidemia (intolerance to lipitor) on Simvastatin, asthma, DJD of lumbar spine, sciatica on right side followed by orthopedics, Dr. Aroldo Maldonado; s/p surgery on his right hip on 03/27/2017. Over the last few weeks, he has developed chest pain often times with shortness of breath and can be with exertion, but at times not with exertion, sometimes radiating to the back. He saw Dr. Floyd Raza on 05/17/2019 and at that time the chest discomfort was more atypical and he was started on Imdur. He does not think Imdur has really made a difference. He does feel more progressively short of breath. He has been on his medications. He is compensated on exam with clear lungs. His blood pressure is 125/72. Assessment and Plan: 1. Unstable angina. Worsening chest pain and shortness of breath, concerning for underlying cardiac ischemia and we will proceed with a cardiac catheterization for further evaluation. Discussed the benefits and risks and he is agreeable to proceed. 2. Mixed hyperlipidemia. Muscle aches with Lipitor, Crestor. Doing well on low dose Zocor. 3. Dizziness. Followed by neurology and ENT. 4. COPD.   
5. Palpitations. If you have questions, please do not hesitate to call me. Sincerely, Caryl Xiong MD

## 2019-06-18 ENCOUNTER — HOSPITAL ENCOUNTER (OUTPATIENT)
Dept: LAB | Age: 84
Discharge: HOME | End: 2019-06-18
Payer: MEDICARE

## 2019-06-18 PROCEDURE — 80053 COMPREHEN METABOLIC PANEL: CPT

## 2019-06-18 PROCEDURE — 85027 COMPLETE CBC AUTOMATED: CPT

## 2019-06-18 PROCEDURE — 36415 COLL VENOUS BLD VENIPUNCTURE: CPT

## 2019-06-19 ENCOUNTER — TELEPHONE (OUTPATIENT)
Dept: CARDIOLOGY CLINIC | Age: 84
End: 2019-06-19

## 2019-06-19 LAB
ALBUMIN SERPL-MCNC: 3.7 G/DL (ref 3.5–4.7)
ALBUMIN/GLOB SERPL: 1.5 {RATIO} (ref 1.2–2.2)
ALP SERPL-CCNC: 68 IU/L (ref 39–117)
ALT SERPL-CCNC: 14 IU/L (ref 0–44)
AST SERPL-CCNC: 19 IU/L (ref 0–40)
BILIRUB SERPL-MCNC: 0.5 MG/DL (ref 0–1.2)
BUN SERPL-MCNC: 18 MG/DL (ref 8–27)
BUN/CREAT SERPL: 22 (ref 10–24)
CALCIUM SERPL-MCNC: 8.8 MG/DL (ref 8.6–10.2)
CHLORIDE SERPL-SCNC: 103 MMOL/L (ref 96–106)
CO2 SERPL-SCNC: 25 MMOL/L (ref 20–29)
CREAT SERPL-MCNC: 0.83 MG/DL (ref 0.76–1.27)
ERYTHROCYTE [DISTWIDTH] IN BLOOD BY AUTOMATED COUNT: 14.1 % (ref 12.3–15.4)
GLOBULIN SER CALC-MCNC: 2.5 G/DL (ref 1.5–4.5)
GLUCOSE SERPL-MCNC: 96 MG/DL (ref 65–99)
HCT VFR BLD AUTO: 39 % (ref 37.5–51)
HGB BLD-MCNC: 12.5 G/DL (ref 13–17.7)
MCH RBC QN AUTO: 28.2 PG (ref 26.6–33)
MCHC RBC AUTO-ENTMCNC: 32.1 G/DL (ref 31.5–35.7)
MCV RBC AUTO: 88 FL (ref 79–97)
PLATELET # BLD AUTO: 138 X10E3/UL (ref 150–450)
POTASSIUM SERPL-SCNC: 4.4 MMOL/L (ref 3.5–5.2)
PROT SERPL-MCNC: 6.2 G/DL (ref 6–8.5)
RBC # BLD AUTO: 4.43 X10E6/UL (ref 4.14–5.8)
SODIUM SERPL-SCNC: 140 MMOL/L (ref 134–144)
WBC # BLD AUTO: 4.5 X10E3/UL (ref 3.4–10.8)

## 2019-06-19 NOTE — TELEPHONE ENCOUNTER
Returned call to patient. Patient and his wife had questions about upcoming procedure and what they needed to bring to the hospital. Verbalized understanding and denies any further questions at this time.

## 2019-06-19 NOTE — TELEPHONE ENCOUNTER
Patient has some questions about his upcoming procedure and the registration process for tomorrow morning.   Patient # [phone]

## 2019-06-20 ENCOUNTER — HOSPITAL ENCOUNTER (OUTPATIENT)
Age: 84
Setting detail: OUTPATIENT SURGERY
Discharge: HOME | End: 2019-06-20
Attending: INTERNAL MEDICINE | Admitting: INTERNAL MEDICINE
Payer: MEDICARE

## 2019-06-20 VITALS
DIASTOLIC BLOOD PRESSURE: 53 MMHG | BODY MASS INDEX: 23.95 KG/M2 | TEMPERATURE: 98.3 F | SYSTOLIC BLOOD PRESSURE: 135 MMHG | HEIGHT: 68 IN | WEIGHT: 158 LBS | RESPIRATION RATE: 16 BRPM | HEART RATE: 65 BPM | OXYGEN SATURATION: 99 %

## 2019-06-20 DIAGNOSIS — I20.0 UNSTABLE ANGINA (HCC): ICD-10-CM

## 2019-06-20 PROCEDURE — 99153 MOD SED SAME PHYS/QHP EA: CPT | Performed by: INTERNAL MEDICINE

## 2019-06-20 PROCEDURE — C1769 GUIDE WIRE: HCPCS | Performed by: INTERNAL MEDICINE

## 2019-06-20 PROCEDURE — 77030004532 HC CATH ANGI DX IMP BSC -A: Performed by: INTERNAL MEDICINE

## 2019-06-20 PROCEDURE — 74011636320 HC RX REV CODE- 636/320: Performed by: INTERNAL MEDICINE

## 2019-06-20 PROCEDURE — 74011250636 HC RX REV CODE- 250/636: Performed by: INTERNAL MEDICINE

## 2019-06-20 PROCEDURE — C1894 INTRO/SHEATH, NON-LASER: HCPCS | Performed by: INTERNAL MEDICINE

## 2019-06-20 PROCEDURE — 74011000250 HC RX REV CODE- 250: Performed by: INTERNAL MEDICINE

## 2019-06-20 PROCEDURE — 99152 MOD SED SAME PHYS/QHP 5/>YRS: CPT | Performed by: INTERNAL MEDICINE

## 2019-06-20 PROCEDURE — 93459 L HRT ART/GRFT ANGIO: CPT | Performed by: INTERNAL MEDICINE

## 2019-06-20 PROCEDURE — 74011250636 HC RX REV CODE- 250/636

## 2019-06-20 RX ORDER — SODIUM CHLORIDE 0.9 % (FLUSH) 0.9 %
5-40 SYRINGE (ML) INJECTION AS NEEDED
Status: CANCELLED | OUTPATIENT
Start: 2019-06-20

## 2019-06-20 RX ORDER — SODIUM CHLORIDE 0.9 % (FLUSH) 0.9 %
5-40 SYRINGE (ML) INJECTION EVERY 8 HOURS
Status: DISCONTINUED | OUTPATIENT
Start: 2019-06-20 | End: 2019-06-20 | Stop reason: HOSPADM

## 2019-06-20 RX ORDER — MIDAZOLAM HYDROCHLORIDE 1 MG/ML
INJECTION, SOLUTION INTRAMUSCULAR; INTRAVENOUS AS NEEDED
Status: DISCONTINUED | OUTPATIENT
Start: 2019-06-20 | End: 2019-06-20 | Stop reason: HOSPADM

## 2019-06-20 RX ORDER — SODIUM CHLORIDE 0.9 % (FLUSH) 0.9 %
5-40 SYRINGE (ML) INJECTION EVERY 8 HOURS
Status: CANCELLED | OUTPATIENT
Start: 2019-06-20

## 2019-06-20 RX ORDER — SODIUM CHLORIDE 9 MG/ML
500 INJECTION, SOLUTION INTRAVENOUS CONTINUOUS
Status: CANCELLED | OUTPATIENT
Start: 2019-06-20 | End: 2019-06-24

## 2019-06-20 RX ORDER — SODIUM CHLORIDE 0.9 % (FLUSH) 0.9 %
5-40 SYRINGE (ML) INJECTION AS NEEDED
Status: DISCONTINUED | OUTPATIENT
Start: 2019-06-20 | End: 2019-06-20 | Stop reason: HOSPADM

## 2019-06-20 RX ORDER — HEPARIN SODIUM 1000 [USP'U]/ML
INJECTION, SOLUTION INTRAVENOUS; SUBCUTANEOUS AS NEEDED
Status: DISCONTINUED | OUTPATIENT
Start: 2019-06-20 | End: 2019-06-20 | Stop reason: HOSPADM

## 2019-06-20 RX ORDER — FENTANYL CITRATE 50 UG/ML
INJECTION, SOLUTION INTRAMUSCULAR; INTRAVENOUS AS NEEDED
Status: DISCONTINUED | OUTPATIENT
Start: 2019-06-20 | End: 2019-06-20 | Stop reason: HOSPADM

## 2019-06-20 RX ORDER — DIPHENHYDRAMINE HYDROCHLORIDE 50 MG/ML
50 INJECTION, SOLUTION INTRAMUSCULAR; INTRAVENOUS
Status: DISCONTINUED | OUTPATIENT
Start: 2019-06-20 | End: 2019-06-20 | Stop reason: HOSPADM

## 2019-06-20 RX ORDER — LIDOCAINE HYDROCHLORIDE 10 MG/ML
INJECTION INFILTRATION; PERINEURAL AS NEEDED
Status: DISCONTINUED | OUTPATIENT
Start: 2019-06-20 | End: 2019-06-20 | Stop reason: HOSPADM

## 2019-06-20 RX ORDER — HEPARIN SODIUM 200 [USP'U]/100ML
INJECTION, SOLUTION INTRAVENOUS
Status: COMPLETED | OUTPATIENT
Start: 2019-06-20 | End: 2019-06-20

## 2019-06-20 RX ORDER — SODIUM CHLORIDE 9 MG/ML
125 INJECTION, SOLUTION INTRAVENOUS CONTINUOUS
Status: DISCONTINUED | OUTPATIENT
Start: 2019-06-20 | End: 2019-06-20 | Stop reason: HOSPADM

## 2019-06-20 RX ADMIN — SODIUM CHLORIDE 125 ML/HR: 900 INJECTION, SOLUTION INTRAVENOUS at 10:00

## 2019-06-20 NOTE — ROUTINE PROCESS
Cardiac Cath Lab Recovery Arrival Note: 
 
 
Alan Jin. arrived to Cardiac Cath Lab, Recovery Area. Staff introduced to patient. Patient identifiers verified with NAME and DATE OF BIRTH. Procedure verified with patient. Consent forms reviewed and signed by patient or authorized representative and verified. Allergies verified. Patient informed of procedure and plan of care. Questions answered with review. Patient prepped for procedure, per orders from physician, prior to arrival. 
 
Patient on cardiac monitor, non-invasive blood pressure, SPO2 monitor. Patient is A&Ox 4. Patient reports no pain, no chest pain, no n/v. Patient in stretcher, in low position, with side rails up, call bell within reach, patient instructed to call of assistance as needed. Patient prep in: [facility] Recovery Area, Bed# 6. Family in: Son: Alfred Edmonds [phone].   
Prep by: Rubia Richter RN

## 2019-06-20 NOTE — PROGRESS NOTES
1200: 2 cc of air d/c from TR Band, left wrist is with no bleeding and no hematoma, pulses are palpable. 1215: 2 cc of air d/c from TR Band, left wrist is with no bleeding and no hematoma, pulses are palpable. 1230: 2 cc of air d/c from TR Band, left wrist is with no bleeding and no hematoma, pulses are palpable. 1245: 2 cc of air d/c from TR Band, left wrist is with no bleeding and no hematoma, pulses are palpable. 1300: 2 cc of air d/c from TR Band, left wrist is with no bleeding and no hematoma, pulses are palpable. 1315: 1 cc of air d/c from TR Band, left wrist is with no bleeding and no hematoma, pulses are palpable. 1330: TR Band removed, Gauze with transparent dressing applied to site. left wrist is with no bleeding and no hematoma, pulses are palpable. Harris Brown ambulated @ 0659 (time) approximately 50 feet. Patient tolerated ambulation without adverse advents. Left Wrist (right/left, groin/arm)  without bleeding or hematoma noted. 1400: I have reviewed discharge instructions with the patient and patient's son. The patient and patient's son verbalized understanding. Copy of discharge instructions given to patient. Patient's son is present to take patient home. Patient wheelchaired out via nursing.

## 2019-06-20 NOTE — PROGRESS NOTES
Cardiac Cath Lab Procedure Area Arrival Note:    Simone Watts. arrived to Cardiac Cath Lab, Procedure Area. Patient identifiers verified with NAME and DATE OF BIRTH. Procedure verified with patient. Consent forms verified. Allergies verified. Patient informed of procedure and plan of care. Questions answered with review. Patient voiced understanding of procedure and plan of care. Patient on cardiac monitor, non-invasive blood pressure, SPO2 monitor. On room air then placed on O2 @ 2 lpm via NC.  IV of normal saline on pump at 125 ml/hr. Patient status doing well with some problems : chest pain 4/10. Patient is A&Ox 4. Patient reports chest pain 4/10 on pain scale. Patient medicated during procedure with orders obtained and verified by Dr. Samantha Willard. Refer to patients Cardiac Cath Lab PROCEDURE REPORT for vital signs, assessment, status, and response during procedure, printed at end of case. Printed report on chart or scanned into chart.

## 2019-06-20 NOTE — PROGRESS NOTES
TRANSFER - IN REPORT:    Verbal report received from Payal Walters on Emely Winslow., Procedure Cardiac cath with no stents , from the Cardiac Cath lab, for routine progression of care. Report consisted of patients Situation, Background, Assessment and Recommendations(SBAR). Information from the following report(s) Procedure Summary, MAR and Recent Results was reviewed with the receiving clinician. Opportunity for questions and clarification was provided. Assessment completed upon patients arrival to [address] [place] [address] and care assumed. Cardiac Cath Lab Recovery Arrival Note:     Emely Ruano arrived to [facility]. Patient procedure= Cardiac cath with no stents. Patient on cardiac monitor, non-invasive blood pressure, Patient status doing well without problems. Patient is A&Ox 4. Patient reports no pain, no chest pain, no n/v. Procedure site without any bleeding and no hematoma.

## 2019-06-20 NOTE — PROGRESS NOTES
TRANSFER - OUT REPORT:    Verbal report given to [facility] (name) on Meghann Murray.  being transferred to cath lab recovery room for Siena Middleton RN (unit) for routine progression of care       Report consisted of patients Situation, Background, Assessment and   Recommendations(SBAR). Information from the following report(s) SBAR, Procedure Summary and MAR was reviewed with the receiving nurse. Lines:   Peripheral IV 06/20/19 Right Antecubital (Active)   Site Assessment Clean, dry, & intact;Dry;Clean; Intact 6/20/2019 10:04 AM   Phlebitis Assessment 0 6/20/2019 10:04 AM   Infiltration Assessment 0 6/20/2019 10:04 AM   Alcohol Cap Used Yes 6/20/2019 10:04 AM        Opportunity for questions and clarification was provided.       Patient transported with:   [employer]

## 2019-06-20 NOTE — DISCHARGE INSTRUCTIONS
Patient ID:  Zee Hannon  [id number]  89 y.o.  1/5/1931    Admit Date: 6/20/2019    Discharge Date: 6/20/2019     Admitting Physician: Sajan Felipe MD     Discharge Physician: Sajan Felipe MD    Admission Diagnoses:   Unstable angina [facility]) [I20.0]    Discharge Diagnoses: Active Problems:    * No active hospital problems. *      Discharge Condition: Stable    Cardiology Procedures this Admission:  {CARDIOLOGY PROCEDURES:01256521}    Disposition: {disposition:42982}    Reference discharge instructions provided by nursing for diet and activity. Signed:  Sajan Felipe MD  6/20/2019  11:50 AM        Radial Cardiac Catheterization/Angiography Discharge Instructions    It is normal to feel tired the first couple days. Take it easy and follow the physicians instructions. CHECK THE CATHETER INSERTION SITE DAILY:    Remove the wrist dressing 24 hours after the procedure. You may shower 24 hours after the procedure. Wash with soap and water and pat dry. Gentle cleaning of the site with soap and water is sufficient, cover with a dry clean dressing or bandage. Do not apply creams or powders to the area. No soaking the wrist for 3 days. Leave the puncture site open to air after 24 hours post-procedure. CALL THE PHYSICIANS:     If the site becomes red, swollen or feels warm to the touch  If there is bleeding or drainage or if there is unusual pain at the radial site. If there is any minor oozing, you may apply a band-aid and remove after 12 hours. If the bleeding continues, hold pressure with the middle finger against the puncture site and the thumb against the back of the wrist,call 911 to be transported to the hospital.  DO NOT DRIVE YOURSELF, [facility] 662. ACTIVITY:   For the first 24 hours do not manipulate the wrist.  No lifting, pushing or pulling over 5 pounds with the affected wrist for 7 days. No straining the insertion site.  Do not life grocery bags or the garbage can, do not run the vacuum  or push  for 7 days. Start with short walks as in the hospital and gradually increase as tolerated each day. It is recommended to walk 30 minutes 5-7 days per week. Follow your physicians instructions on activity. Avoid walking outside in extremes of heat or cold. Walk inside when it is cold and windy or hot and humid. Things to keep in mind:  No driving for at least 24 hours, or as designated by your physician. Limit the number of times you go up and down the stairs  Take rests and pace yourself with activity. Be careful and do not strain with bowel movements. MEDICATIONS:    Take all medications as prescribed  Call your physician if you have any questions  Keep an updated list of your medications with you at all times and give a list to your physician and pharmacist    SIGNS AND SYMPTOMS:   Be cautious of symptoms of angina or recurrent symptoms such as chest discomfort, unusual shortness of breath or fatigue. These could be symptoms of restenosis, a new blockage or a heart attack. If your symptoms are relieved with rest it is still recommended that you notify your physician of recurrent chest pain or discomfort. For CHEST PAIN or symptoms of angina not relieved with rest:  If the discomfort is not relieved with rest, and you have been prescribed Nitroglycerin, take as directed (taken under the tongue, one at a time 5 minutes apart for a total of 3 doses). If the discomfort is not relieved after the 3rd nitroglycerin, call 911. If you have not been prescribed Nitroglycerin  and your chest discomfort is not relieved with rest, call 911. AFTER CARE:   Follow up with your physician as instructed. Follow a heart healthy diet with proper portion control, daily stress management, daily exercise, blood pressure and cholesterol control , and smoking cessation.

## 2019-08-07 ENCOUNTER — TELEPHONE (OUTPATIENT)
Dept: CARDIOLOGY CLINIC | Age: 84
End: 2019-08-07

## 2019-08-07 NOTE — TELEPHONE ENCOUNTER
Isaura Currie from [facility] states they need to do a biopsy on this patient and need to know if he can come off of his aspirin before scheduling. Please advise.      Fax: [phone]    Phone: [phone]

## 2019-08-07 NOTE — TELEPHONE ENCOUNTER
David Mason MD  You 14 minutes ago (1:34 PM)      Aspirin can be held as needed.  48 hrs prior and restart after but can be held longer if they need.  thx

## 2019-08-13 ENCOUNTER — TELEPHONE (OUTPATIENT)
Dept: CARDIOLOGY CLINIC | Age: 84
End: 2019-08-13

## 2019-08-13 NOTE — TELEPHONE ENCOUNTER
Verified patient with two types of identifiers. Patient's wife calling on behalf of patient who is experiencing shortness of breath and stomach pain for the last two days. She states he does not have any chest pain, nausea, vomiting or any other symptoms. She reports she called his PCP but was told he is out of town. She also reports he had a prostate biopsy yesterday and was told he has gallstones during this procedure. Patient's wife also reports he breathes better when using spriva but the [facility] would not refill the prescription and gave him Qvar instead. Recommended patient use his rescue inhaler as prescribed and to contact the doctor who performed the biopsy yesterday. Also let her know I will touch base with Dr. Odell Kc associate as he in the office for recommendations. Patient's wife verbalized understanding and will call with any other questions.

## 2019-08-15 ENCOUNTER — HOSPITAL ENCOUNTER (OUTPATIENT)
Dept: NUCLEAR MEDICINE | Age: 84
Discharge: HOME | End: 2019-08-15
Attending: UROLOGY
Payer: MEDICARE

## 2019-08-15 DIAGNOSIS — C61 PROSTATE CANCER (HCC): ICD-10-CM

## 2019-08-15 PROCEDURE — 78306 BONE IMAGING WHOLE BODY: CPT

## 2019-09-10 ENCOUNTER — TELEPHONE (OUTPATIENT)
Dept: CARDIOLOGY CLINIC | Age: 84
End: 2019-09-10

## 2019-09-10 NOTE — TELEPHONE ENCOUNTER
Stephanie Gilmore from Dr Nemesio Blancas office would like to let you know that she is faxing over a clearance request for this patient to fax number: [phone] thanks!     Phone: [phone]

## 2019-09-11 ENCOUNTER — HOSPITAL ENCOUNTER (OUTPATIENT)
Dept: PREADMISSION TESTING | Age: 84
Discharge: HOME | End: 2019-09-11
Attending: UROLOGY
Payer: MEDICARE

## 2019-09-11 VITALS
DIASTOLIC BLOOD PRESSURE: 64 MMHG | SYSTOLIC BLOOD PRESSURE: 159 MMHG | RESPIRATION RATE: 18 BRPM | TEMPERATURE: 98.1 F | OXYGEN SATURATION: 95 % | HEART RATE: 71 BPM | WEIGHT: 150.35 LBS | HEIGHT: 68 IN | BODY MASS INDEX: 22.79 KG/M2

## 2019-09-11 LAB
ABO + RH BLD: NORMAL
ALBUMIN SERPL-MCNC: 3.6 G/DL (ref 3.5–5)
ALBUMIN/GLOB SERPL: 1 {RATIO} (ref 1.1–2.2)
ALP SERPL-CCNC: 153 U/L (ref 45–117)
ALT SERPL-CCNC: 18 U/L (ref 12–78)
ANION GAP SERPL CALC-SCNC: 4 MMOL/L (ref 5–15)
APPEARANCE UR: ABNORMAL
AST SERPL-CCNC: 17 U/L (ref 15–37)
BACTERIA URNS QL MICRO: ABNORMAL /HPF
BILIRUB SERPL-MCNC: 0.5 MG/DL (ref 0.2–1)
BILIRUB UR QL: NEGATIVE
BLOOD GROUP ANTIBODIES SERPL: NORMAL
BUN SERPL-MCNC: 18 MG/DL (ref 6–20)
BUN/CREAT SERPL: 19 (ref 12–20)
CALCIUM SERPL-MCNC: 8.9 MG/DL (ref 8.5–10.1)
CHLORIDE SERPL-SCNC: 98 MMOL/L (ref 97–108)
CO2 SERPL-SCNC: 29 MMOL/L (ref 21–32)
COLOR UR: ABNORMAL
CREAT SERPL-MCNC: 0.93 MG/DL (ref 0.7–1.3)
EPITH CASTS URNS QL MICRO: ABNORMAL /LPF
ERYTHROCYTE [DISTWIDTH] IN BLOOD BY AUTOMATED COUNT: 13.7 % (ref 11.5–14.5)
GLOBULIN SER CALC-MCNC: 3.6 G/DL (ref 2–4)
GLUCOSE SERPL-MCNC: 100 MG/DL (ref 65–100)
GLUCOSE UR STRIP.AUTO-MCNC: NEGATIVE MG/DL
HCT VFR BLD AUTO: 39 % (ref 36.6–50.3)
HGB BLD-MCNC: 12.6 G/DL (ref 12.1–17)
HGB UR QL STRIP: ABNORMAL
HYALINE CASTS URNS QL MICRO: ABNORMAL /LPF (ref 0–5)
KETONES UR QL STRIP.AUTO: NEGATIVE MG/DL
LEUKOCYTE ESTERASE UR QL STRIP.AUTO: ABNORMAL
MCH RBC QN AUTO: 27.3 PG (ref 26–34)
MCHC RBC AUTO-ENTMCNC: 32.3 G/DL (ref 30–36.5)
MCV RBC AUTO: 84.6 FL (ref 80–99)
MUCOUS THREADS URNS QL MICRO: ABNORMAL /LPF
NITRITE UR QL STRIP.AUTO: POSITIVE
NRBC # BLD: 0 K/UL (ref 0–0.01)
NRBC BLD-RTO: 0 PER 100 WBC
PH UR STRIP: 7.5 [PH] (ref 5–8)
PLATELET # BLD AUTO: 157 K/UL (ref 150–400)
PMV BLD AUTO: 9.4 FL (ref 8.9–12.9)
POTASSIUM SERPL-SCNC: 4.4 MMOL/L (ref 3.5–5.1)
PROT SERPL-MCNC: 7.2 G/DL (ref 6.4–8.2)
PROT UR STRIP-MCNC: 30 MG/DL
RBC # BLD AUTO: 4.61 M/UL (ref 4.1–5.7)
RBC #/AREA URNS HPF: ABNORMAL /HPF (ref 0–5)
SODIUM SERPL-SCNC: 131 MMOL/L (ref 136–145)
SP GR UR REFRACTOMETRY: 1.01 (ref 1–1.03)
SPECIMEN EXP DATE BLD: NORMAL
TRI-PHOS CRY URNS QL MICRO: ABNORMAL
UA: UC IF INDICATED,UAUC: ABNORMAL
UROBILINOGEN UR QL STRIP.AUTO: 0.2 EU/DL (ref 0.2–1)
WBC # BLD AUTO: 7.5 K/UL (ref 4.1–11.1)
WBC URNS QL MICRO: >100 /HPF (ref 0–4)

## 2019-09-11 PROCEDURE — 80053 COMPREHEN METABOLIC PANEL: CPT

## 2019-09-11 PROCEDURE — 86900 BLOOD TYPING SEROLOGIC ABO: CPT

## 2019-09-11 PROCEDURE — 36416 COLLJ CAPILLARY BLOOD SPEC: CPT

## 2019-09-11 PROCEDURE — 87086 URINE CULTURE/COLONY COUNT: CPT

## 2019-09-11 PROCEDURE — 87077 CULTURE AEROBIC IDENTIFY: CPT

## 2019-09-11 PROCEDURE — 81001 URINALYSIS AUTO W/SCOPE: CPT

## 2019-09-11 PROCEDURE — 87186 SC STD MICRODIL/AGAR DIL: CPT

## 2019-09-11 PROCEDURE — 85027 COMPLETE CBC AUTOMATED: CPT

## 2019-09-11 RX ORDER — TRAMADOL HYDROCHLORIDE 50 MG/1
50 TABLET ORAL
COMMUNITY

## 2019-09-11 RX ORDER — BICALUTAMIDE 50 MG/1
50 TABLET, FILM COATED ORAL DAILY
COMMUNITY

## 2019-09-11 RX ORDER — SODIUM CHLORIDE, SODIUM LACTATE, POTASSIUM CHLORIDE, CALCIUM CHLORIDE 600; 310; 30; 20 MG/100ML; MG/100ML; MG/100ML; MG/100ML
25 INJECTION, SOLUTION INTRAVENOUS CONTINUOUS
Status: CANCELLED | OUTPATIENT
Start: 2019-09-19

## 2019-09-11 RX ORDER — MICONAZOLE NITRATE 2 %
CREAM WITH APPLICATOR VAGINAL 2 TIMES DAILY
COMMUNITY

## 2019-09-11 RX ORDER — OXYCODONE HYDROCHLORIDE 5 MG/1
5 TABLET ORAL
COMMUNITY

## 2019-09-11 NOTE — PERIOP NOTES
[facility]  Preoperative Instructions        Surgery Date 9/19/2019         Time of Arrival 8:00 AM    1. On the day of your surgery, please report to the Surgical Services Registration Desk and sign in at your designated time. The Surgery Center is located to the right of the Emergency Room. 2. You must have someone with you to drive you home. You should not drive a car for 24 hours following surgery. Please make arrangements for a friend or family member to stay with you for the first 24 hours after your surgery. 3. Do not have anything to eat or drink (including water, gum, mints, coffee, juice) after midnight ? 9/18/2019? Gary Lew ? This may not apply to medications prescribed by your physician. ?(Please note below the special instructions with medications to take the morning of your procedure.)    4. We recommend you do not drink any alcoholic beverages for 24 hours before and after your surgery. 5. Contact your surgeons office for instructions on the following medications: non-steroidal anti-inflammatory drugs (i.e. Advil, Aleve), vitamins, and supplements. (Some surgeons will want you to stop these medications prior to surgery and others may allow you to take them)  **If you are currently taking Plavix, Coumadin, Aspirin and/or other blood-thinning agents, contact your surgeon for instructions. ** Your surgeon will partner with the physician prescribing these medications to determine if it is safe to stop or if you need to continue taking. Please do not stop taking these medications without instructions from your surgeon    6. Wear comfortable clothes. Wear glasses instead of contacts. Do not bring any money or jewelry. Please bring picture ID, insurance card, and any prearranged co-payment or hospital payment. Do not wear make-up, particularly mascara the morning of your surgery. Do not wear nail polish, particularly if you are having foot /hand surgery.   Wear your hair loose or down, no ponytails, buns, giselle pins or clips. All body piercings must be removed. Please shower with antibacterial soap for three consecutive days before and on the morning of surgery, but do not apply any lotions, powders or deodorants after the shower on the day of surgery. Please use a fresh towels after each shower. Please sleep in clean clothes and change bed linens the night before surgery. Please do not shave for 48 hours prior to surgery. Shaving of the face is acceptable. 7. You should understand that if you do not follow these instructions your surgery may be cancelled. If your physical condition changes (I.e. fever, cold or flu) please contact your surgeon as soon as possible. 8. It is important that you be on time. If a situation occurs where you may be late, please call [phone] (OR Holding Area). 9. If you have any questions and or problems, please call [phone] (Pre-admission Testing). 10. Your surgery time may be subject to change. You will receive a phone call the evening prior if your time changes. 11.  If having outpatient surgery, you must have someone to drive you here, stay with you during the duration of your stay, and to drive you home at time of discharge. 12.   In an effort to improve the efficiency, privacy, and safety for all of our Pre-op patients visitors are not allowed in the Holding area. Once you arrive and are registered your family/visitors will be asked to remain in the waiting room. The Pre-op staff will get you from the Surgical Waiting Area and will explain to you and your family/visitors that the Pre-op phase is beginning. The staff will answer any questions and provide instructions for tracking of the patient, by use of the existing tracking number and color-coded status board in the waiting room.   At this time the staff will also ask for your designated spokesperson information in the event that the physician or staff need to provide an update or obtain any pertinent information. The designated spokesperson will be notified if the physician needs to speak to family during the pre-operative phase. If at any time your family/visitors has questions or concerns they may approach the volunteer desk in the waiting area for assistance. Special Instructions:Use your Pro Air Inhaler the morning of surgery and bring all Inhalers with you to the hospital the day of surgery. MEDICATIONS TO TAKE THE MORNING OF SURGERY WITH A SIP OF WATER:Bucalutamide,oxycodone if needed, Tramadol if needed      I understand a pre-operative phone call will be made to verify my surgery time. In the event that I am not available, I give permission for a message to be left on my answering service and/or with another person?   Yes [phone]         ___________________      __________   _________    (Signature of Patient)             (Witness)                (Date and Time)

## 2019-09-12 RX ORDER — CEFAZOLIN SODIUM/WATER 2 G/20 ML
2 SYRINGE (ML) INTRAVENOUS ONCE
Status: CANCELLED | OUTPATIENT
Start: 2019-09-19 | End: 2019-09-19

## 2019-09-12 NOTE — PERIOP NOTES
Pre-op Ancef dosage adjusted per pt's weight recommendation by pharmacy, no new order needed per Dr Theodore Salazar.  Eduarda Sanderson RN

## 2019-09-13 ENCOUNTER — TELEPHONE (OUTPATIENT)
Dept: CARDIOLOGY CLINIC | Age: 84
End: 2019-09-13

## 2019-09-13 ENCOUNTER — HOSPITAL ENCOUNTER (INPATIENT)
Age: 84
LOS: 1 days | Discharge: HOME | DRG: 149 | End: 2019-09-14
Attending: EMERGENCY MEDICINE | Admitting: INTERNAL MEDICINE
Payer: MEDICARE

## 2019-09-13 DIAGNOSIS — R42 VERTIGO: Primary | ICD-10-CM

## 2019-09-13 DIAGNOSIS — R11.2 INTRACTABLE VOMITING WITH NAUSEA, UNSPECIFIED VOMITING TYPE: ICD-10-CM

## 2019-09-13 DIAGNOSIS — N39.0 URINARY TRACT INFECTION WITHOUT HEMATURIA, SITE UNSPECIFIED: ICD-10-CM

## 2019-09-13 LAB
ALBUMIN SERPL-MCNC: 3.4 G/DL (ref 3.5–5)
ALBUMIN/GLOB SERPL: 0.9 {RATIO} (ref 1.1–2.2)
ALP SERPL-CCNC: 149 U/L (ref 45–117)
ALT SERPL-CCNC: 19 U/L (ref 12–78)
ANION GAP SERPL CALC-SCNC: 13 MMOL/L (ref 5–15)
AST SERPL-CCNC: 16 U/L (ref 15–37)
BACTERIA SPEC CULT: ABNORMAL
BASOPHILS # BLD: 0 K/UL (ref 0–0.1)
BASOPHILS NFR BLD: 0 % (ref 0–1)
BILIRUB SERPL-MCNC: 0.9 MG/DL (ref 0.2–1)
BUN SERPL-MCNC: 12 MG/DL (ref 6–20)
BUN/CREAT SERPL: 13 (ref 12–20)
CALCIUM SERPL-MCNC: 9.2 MG/DL (ref 8.5–10.1)
CC UR VC: ABNORMAL
CHLORIDE SERPL-SCNC: 94 MMOL/L (ref 97–108)
CO2 SERPL-SCNC: 24 MMOL/L (ref 21–32)
COMMENT, HOLDF: NORMAL
CREAT SERPL-MCNC: 0.9 MG/DL (ref 0.7–1.3)
DIFFERENTIAL METHOD BLD: ABNORMAL
EOSINOPHIL # BLD: 0.1 K/UL (ref 0–0.4)
EOSINOPHIL NFR BLD: 1 % (ref 0–7)
ERYTHROCYTE [DISTWIDTH] IN BLOOD BY AUTOMATED COUNT: 13.5 % (ref 11.5–14.5)
GLOBULIN SER CALC-MCNC: 3.6 G/DL (ref 2–4)
GLUCOSE SERPL-MCNC: 157 MG/DL (ref 65–100)
HCT VFR BLD AUTO: 37.5 % (ref 36.6–50.3)
HGB BLD-MCNC: 12.2 G/DL (ref 12.1–17)
IMM GRANULOCYTES # BLD AUTO: 0.1 K/UL (ref 0–0.04)
IMM GRANULOCYTES NFR BLD AUTO: 1 % (ref 0–0.5)
LYMPHOCYTES # BLD: 0.6 K/UL (ref 0.8–3.5)
LYMPHOCYTES NFR BLD: 8 % (ref 12–49)
MCH RBC QN AUTO: 27.2 PG (ref 26–34)
MCHC RBC AUTO-ENTMCNC: 32.5 G/DL (ref 30–36.5)
MCV RBC AUTO: 83.5 FL (ref 80–99)
MONOCYTES # BLD: 0.6 K/UL (ref 0–1)
MONOCYTES NFR BLD: 8 % (ref 5–13)
NEUTS SEG # BLD: 6.3 K/UL (ref 1.8–8)
NEUTS SEG NFR BLD: 82 % (ref 32–75)
NRBC # BLD: 0 K/UL (ref 0–0.01)
NRBC BLD-RTO: 0 PER 100 WBC
PLATELET # BLD AUTO: 145 K/UL (ref 150–400)
PMV BLD AUTO: 9.6 FL (ref 8.9–12.9)
POTASSIUM SERPL-SCNC: 4 MMOL/L (ref 3.5–5.1)
PROT SERPL-MCNC: 7 G/DL (ref 6.4–8.2)
RBC # BLD AUTO: 4.49 M/UL (ref 4.1–5.7)
RBC MORPH BLD: ABNORMAL
SAMPLES BEING HELD,HOLD: NORMAL
SERVICE CMNT-IMP: ABNORMAL
SODIUM SERPL-SCNC: 131 MMOL/L (ref 136–145)
TROPONIN I SERPL-MCNC: <0.05 NG/ML
WBC # BLD AUTO: 7.7 K/UL (ref 4.1–11.1)

## 2019-09-13 PROCEDURE — 96365 THER/PROPH/DIAG IV INF INIT: CPT

## 2019-09-13 PROCEDURE — 93005 ELECTROCARDIOGRAM TRACING: CPT

## 2019-09-13 PROCEDURE — 99283 EMERGENCY DEPT VISIT LOW MDM: CPT

## 2019-09-13 PROCEDURE — 36415 COLL VENOUS BLD VENIPUNCTURE: CPT

## 2019-09-13 PROCEDURE — 74011250636 HC RX REV CODE- 250/636: Performed by: EMERGENCY MEDICINE

## 2019-09-13 PROCEDURE — 80053 COMPREHEN METABOLIC PANEL: CPT

## 2019-09-13 PROCEDURE — 96361 HYDRATE IV INFUSION ADD-ON: CPT

## 2019-09-13 PROCEDURE — 85025 COMPLETE CBC W/AUTO DIFF WBC: CPT

## 2019-09-13 PROCEDURE — 84484 ASSAY OF TROPONIN QUANT: CPT

## 2019-09-13 PROCEDURE — 96375 TX/PRO/DX INJ NEW DRUG ADDON: CPT

## 2019-09-13 RX ORDER — LEVOFLOXACIN 5 MG/ML
750 INJECTION, SOLUTION INTRAVENOUS
Status: COMPLETED | OUTPATIENT
Start: 2019-09-13 | End: 2019-09-14

## 2019-09-13 RX ORDER — ONDANSETRON 2 MG/ML
4 INJECTION INTRAMUSCULAR; INTRAVENOUS
Status: COMPLETED | OUTPATIENT
Start: 2019-09-13 | End: 2019-09-13

## 2019-09-13 RX ORDER — MECLIZINE HCL 12.5 MG 12.5 MG/1
25 TABLET ORAL
Status: COMPLETED | OUTPATIENT
Start: 2019-09-13 | End: 2019-09-13

## 2019-09-13 RX ADMIN — ONDANSETRON 4 MG: 2 INJECTION INTRAMUSCULAR; INTRAVENOUS at 22:47

## 2019-09-13 RX ADMIN — MECLIZINE 25 MG: 12.5 TABLET ORAL at 22:24

## 2019-09-13 RX ADMIN — SODIUM CHLORIDE 1000 ML: 900 INJECTION, SOLUTION INTRAVENOUS at 22:26

## 2019-09-13 RX ADMIN — LEVOFLOXACIN 750 MG: 5 INJECTION, SOLUTION INTRAVENOUS at 22:47

## 2019-09-13 NOTE — PERIOP NOTES
PC to Dr Irene Amador office , spoke with Simon Downing. Requested Clearance note for pt's upcoming surgery on 9/19/2019. Stated she will give this request to the nurse, fax and phone numbers given.  Mary Steve RN

## 2019-09-13 NOTE — PERIOP NOTES
PC to Dr Grossman's office spoke with Mohit aRmirez, request call back by nurse due to Positive Urine Culture Results. Moiht Ramirez states she will have nurse call back at [phone], Results Faxed to Dr Grossman's office. Confirmation fax received.   Carole Armenta RN

## 2019-09-13 NOTE — TELEPHONE ENCOUNTER
Cristina Garcia from Dr. Blanca Jacob office is calling to request clearance letter for patient's prostate surgery on 9/19/19.        Fax: [phone]    Phone: [phone]

## 2019-09-13 NOTE — PERIOP NOTES
Received Cardiac Clearance note from Dr Ortiz Ellison via fax. He noted pt could stop Aspirin 5 days prior to procedure. PC to pt, spoke with pt's wife, instructed  Mr Alyssa Silva to stop Aspirin 5 days prior to surgery per Dr Eileen Pereira, wife able to repeat back instructions accurately.  Galina CALDERA

## 2019-09-14 ENCOUNTER — APPOINTMENT (OUTPATIENT)
Dept: CT IMAGING | Age: 84
DRG: 149 | End: 2019-09-14
Attending: PSYCHIATRY & NEUROLOGY
Payer: MEDICARE

## 2019-09-14 ENCOUNTER — APPOINTMENT (OUTPATIENT)
Dept: MRI IMAGING | Age: 84
DRG: 149 | End: 2019-09-14
Attending: INTERNAL MEDICINE
Payer: MEDICARE

## 2019-09-14 ENCOUNTER — APPOINTMENT (OUTPATIENT)
Dept: CT IMAGING | Age: 84
DRG: 149 | End: 2019-09-14
Attending: EMERGENCY MEDICINE
Payer: MEDICARE

## 2019-09-14 VITALS
RESPIRATION RATE: 18 BRPM | TEMPERATURE: 98.3 F | HEIGHT: 67 IN | WEIGHT: 157.19 LBS | OXYGEN SATURATION: 98 % | SYSTOLIC BLOOD PRESSURE: 136 MMHG | BODY MASS INDEX: 24.67 KG/M2 | DIASTOLIC BLOOD PRESSURE: 49 MMHG | HEART RATE: 85 BPM

## 2019-09-14 PROBLEM — R42 VERTIGO: Status: ACTIVE | Noted: 2019-09-14

## 2019-09-14 PROBLEM — R11.2 NAUSEA AND VOMITING: Status: ACTIVE | Noted: 2019-09-14

## 2019-09-14 LAB
ALBUMIN SERPL-MCNC: 3.1 G/DL (ref 3.5–5)
ALBUMIN SERPL-MCNC: 3.2 G/DL (ref 3.5–5)
ALBUMIN/GLOB SERPL: 0.9 {RATIO} (ref 1.1–2.2)
ALBUMIN/GLOB SERPL: 0.9 {RATIO} (ref 1.1–2.2)
ALP SERPL-CCNC: 138 U/L (ref 45–117)
ALP SERPL-CCNC: 142 U/L (ref 45–117)
ALT SERPL-CCNC: 15 U/L (ref 12–78)
ALT SERPL-CCNC: 16 U/L (ref 12–78)
ANION GAP SERPL CALC-SCNC: 9 MMOL/L (ref 5–15)
ANION GAP SERPL CALC-SCNC: 9 MMOL/L (ref 5–15)
APPEARANCE UR: ABNORMAL
AST SERPL-CCNC: 13 U/L (ref 15–37)
AST SERPL-CCNC: 14 U/L (ref 15–37)
BACTERIA URNS QL MICRO: ABNORMAL /HPF
BASOPHILS # BLD: 0 K/UL (ref 0–0.1)
BASOPHILS NFR BLD: 0 % (ref 0–1)
BILIRUB SERPL-MCNC: 0.8 MG/DL (ref 0.2–1)
BILIRUB SERPL-MCNC: 0.9 MG/DL (ref 0.2–1)
BILIRUB UR QL: NEGATIVE
BUN SERPL-MCNC: 10 MG/DL (ref 6–20)
BUN SERPL-MCNC: 9 MG/DL (ref 6–20)
BUN/CREAT SERPL: 11 (ref 12–20)
BUN/CREAT SERPL: 12 (ref 12–20)
CALCIUM SERPL-MCNC: 9.1 MG/DL (ref 8.5–10.1)
CALCIUM SERPL-MCNC: 9.3 MG/DL (ref 8.5–10.1)
CHLORIDE SERPL-SCNC: 98 MMOL/L (ref 97–108)
CHLORIDE SERPL-SCNC: 98 MMOL/L (ref 97–108)
CHOLEST SERPL-MCNC: 219 MG/DL
CO2 SERPL-SCNC: 26 MMOL/L (ref 21–32)
CO2 SERPL-SCNC: 28 MMOL/L (ref 21–32)
COLOR UR: ABNORMAL
CREAT SERPL-MCNC: 0.81 MG/DL (ref 0.7–1.3)
CREAT SERPL-MCNC: 0.83 MG/DL (ref 0.7–1.3)
CRP SERPL-MCNC: 0.98 MG/DL (ref 0–0.6)
DIFFERENTIAL METHOD BLD: ABNORMAL
EOSINOPHIL # BLD: 0.1 K/UL (ref 0–0.4)
EOSINOPHIL NFR BLD: 1 % (ref 0–7)
EPITH CASTS URNS QL MICRO: ABNORMAL /LPF
ERYTHROCYTE [DISTWIDTH] IN BLOOD BY AUTOMATED COUNT: 13.2 % (ref 11.5–14.5)
ERYTHROCYTE [SEDIMENTATION RATE] IN BLOOD: 14 MM/HR (ref 0–20)
EST. AVERAGE GLUCOSE BLD GHB EST-MCNC: 114 MG/DL
GLOBULIN SER CALC-MCNC: 3.5 G/DL (ref 2–4)
GLOBULIN SER CALC-MCNC: 3.5 G/DL (ref 2–4)
GLUCOSE SERPL-MCNC: 103 MG/DL (ref 65–100)
GLUCOSE SERPL-MCNC: 104 MG/DL (ref 65–100)
GLUCOSE UR STRIP.AUTO-MCNC: NEGATIVE MG/DL
HBA1C MFR BLD: 5.6 % (ref 4.2–6.3)
HCT VFR BLD AUTO: 37 % (ref 36.6–50.3)
HDLC SERPL-MCNC: 64 MG/DL
HDLC SERPL: 3.4 {RATIO} (ref 0–5)
HGB BLD-MCNC: 12.2 G/DL (ref 12.1–17)
HGB UR QL STRIP: ABNORMAL
IMM GRANULOCYTES # BLD AUTO: 0 K/UL (ref 0–0.04)
IMM GRANULOCYTES NFR BLD AUTO: 0 % (ref 0–0.5)
KETONES UR QL STRIP.AUTO: ABNORMAL MG/DL
LDLC SERPL CALC-MCNC: 141.8 MG/DL (ref 0–100)
LEUKOCYTE ESTERASE UR QL STRIP.AUTO: ABNORMAL
LIPID PROFILE,FLP: ABNORMAL
LYMPHOCYTES # BLD: 0.7 K/UL (ref 0.8–3.5)
LYMPHOCYTES NFR BLD: 11 % (ref 12–49)
MAGNESIUM SERPL-MCNC: 1.8 MG/DL (ref 1.6–2.4)
MCH RBC QN AUTO: 27.5 PG (ref 26–34)
MCHC RBC AUTO-ENTMCNC: 33 G/DL (ref 30–36.5)
MCV RBC AUTO: 83.3 FL (ref 80–99)
MONOCYTES # BLD: 0.8 K/UL (ref 0–1)
MONOCYTES NFR BLD: 12 % (ref 5–13)
NEUTS SEG # BLD: 4.8 K/UL (ref 1.8–8)
NEUTS SEG NFR BLD: 76 % (ref 32–75)
NITRITE UR QL STRIP.AUTO: POSITIVE
NRBC # BLD: 0 K/UL (ref 0–0.01)
NRBC BLD-RTO: 0 PER 100 WBC
PH UR STRIP: 7 [PH] (ref 5–8)
PLATELET # BLD AUTO: 148 K/UL (ref 150–400)
PMV BLD AUTO: 9.7 FL (ref 8.9–12.9)
POTASSIUM SERPL-SCNC: 4.1 MMOL/L (ref 3.5–5.1)
POTASSIUM SERPL-SCNC: 4.4 MMOL/L (ref 3.5–5.1)
PROT SERPL-MCNC: 6.6 G/DL (ref 6.4–8.2)
PROT SERPL-MCNC: 6.7 G/DL (ref 6.4–8.2)
PROT UR STRIP-MCNC: 100 MG/DL
RBC # BLD AUTO: 4.44 M/UL (ref 4.1–5.7)
RBC #/AREA URNS HPF: ABNORMAL /HPF (ref 0–5)
RBC MORPH BLD: ABNORMAL
SODIUM SERPL-SCNC: 133 MMOL/L (ref 136–145)
SODIUM SERPL-SCNC: 135 MMOL/L (ref 136–145)
SP GR UR REFRACTOMETRY: <1.005 (ref 1–1.03)
TRIGL SERPL-MCNC: 66 MG/DL (ref ?–150)
TROPONIN I SERPL-MCNC: <0.05 NG/ML
TSH SERPL DL<=0.05 MIU/L-ACNC: 1.04 UIU/ML (ref 0.36–3.74)
UR CULT HOLD, URHOLD: NORMAL
UROBILINOGEN UR QL STRIP.AUTO: 0.2 EU/DL (ref 0.2–1)
VLDLC SERPL CALC-MCNC: 13.2 MG/DL
WBC # BLD AUTO: 6.4 K/UL (ref 4.1–11.1)
WBC URNS QL MICRO: >100 /HPF (ref 0–4)

## 2019-09-14 PROCEDURE — 83036 HEMOGLOBIN GLYCOSYLATED A1C: CPT

## 2019-09-14 PROCEDURE — 84443 ASSAY THYROID STIM HORMONE: CPT

## 2019-09-14 PROCEDURE — 70553 MRI BRAIN STEM W/O & W/DYE: CPT

## 2019-09-14 PROCEDURE — 85652 RBC SED RATE AUTOMATED: CPT

## 2019-09-14 PROCEDURE — 87186 SC STD MICRODIL/AGAR DIL: CPT

## 2019-09-14 PROCEDURE — 99283 EMERGENCY DEPT VISIT LOW MDM: CPT

## 2019-09-14 PROCEDURE — 74011000250 HC RX REV CODE- 250: Performed by: INTERNAL MEDICINE

## 2019-09-14 PROCEDURE — 74011250637 HC RX REV CODE- 250/637: Performed by: INTERNAL MEDICINE

## 2019-09-14 PROCEDURE — 96361 HYDRATE IV INFUSION ADD-ON: CPT

## 2019-09-14 PROCEDURE — 70450 CT HEAD/BRAIN W/O DYE: CPT

## 2019-09-14 PROCEDURE — 80053 COMPREHEN METABOLIC PANEL: CPT

## 2019-09-14 PROCEDURE — 70548 MR ANGIOGRAPHY NECK W/DYE: CPT

## 2019-09-14 PROCEDURE — 87077 CULTURE AEROBIC IDENTIFY: CPT

## 2019-09-14 PROCEDURE — 77030005518 HC CATH URETH FOL 2W BARD -B

## 2019-09-14 PROCEDURE — 36415 COLL VENOUS BLD VENIPUNCTURE: CPT

## 2019-09-14 PROCEDURE — 74011250636 HC RX REV CODE- 250/636: Performed by: INTERNAL MEDICINE

## 2019-09-14 PROCEDURE — 65270000029 HC RM PRIVATE

## 2019-09-14 PROCEDURE — 87086 URINE CULTURE/COLONY COUNT: CPT

## 2019-09-14 PROCEDURE — 74011636320 HC RX REV CODE- 636/320: Performed by: RADIOLOGY

## 2019-09-14 PROCEDURE — 74011250636 HC RX REV CODE- 250/636: Performed by: EMERGENCY MEDICINE

## 2019-09-14 PROCEDURE — 86140 C-REACTIVE PROTEIN: CPT

## 2019-09-14 PROCEDURE — C9113 INJ PANTOPRAZOLE SODIUM, VIA: HCPCS | Performed by: INTERNAL MEDICINE

## 2019-09-14 PROCEDURE — 85025 COMPLETE CBC W/AUTO DIFF WBC: CPT

## 2019-09-14 PROCEDURE — 70496 CT ANGIOGRAPHY HEAD: CPT

## 2019-09-14 PROCEDURE — 81001 URINALYSIS AUTO W/SCOPE: CPT

## 2019-09-14 PROCEDURE — 99218 HC RM OBSERVATION: CPT

## 2019-09-14 PROCEDURE — 74011000258 HC RX REV CODE- 258: Performed by: INTERNAL MEDICINE

## 2019-09-14 PROCEDURE — 84484 ASSAY OF TROPONIN QUANT: CPT

## 2019-09-14 PROCEDURE — 96375 TX/PRO/DX INJ NEW DRUG ADDON: CPT

## 2019-09-14 PROCEDURE — A9585 GADOBUTROL INJECTION: HCPCS | Performed by: INTERNAL MEDICINE

## 2019-09-14 PROCEDURE — 96365 THER/PROPH/DIAG IV INF INIT: CPT

## 2019-09-14 PROCEDURE — 80061 LIPID PANEL: CPT

## 2019-09-14 PROCEDURE — 83735 ASSAY OF MAGNESIUM: CPT

## 2019-09-14 PROCEDURE — 70544 MR ANGIOGRAPHY HEAD W/O DYE: CPT

## 2019-09-14 RX ORDER — HEPARIN SODIUM 5000 [USP'U]/ML
5000 INJECTION, SOLUTION INTRAVENOUS; SUBCUTANEOUS EVERY 8 HOURS
Status: DISCONTINUED | OUTPATIENT
Start: 2019-09-14 | End: 2019-09-14 | Stop reason: HOSPADM

## 2019-09-14 RX ORDER — FAMOTIDINE 10 MG/ML
INJECTION INTRAVENOUS
Status: DISPENSED
Start: 2019-09-14 | End: 2019-09-14

## 2019-09-14 RX ORDER — PANTOPRAZOLE SODIUM 40 MG/1
40 TABLET, DELAYED RELEASE ORAL DAILY
Qty: 30 TAB | Refills: 0 | Status: SHIPPED | OUTPATIENT
Start: 2019-09-14 | End: 2019-10-14

## 2019-09-14 RX ORDER — SIMVASTATIN 10 MG/1
10 TABLET, FILM COATED ORAL
Status: DISCONTINUED | OUTPATIENT
Start: 2019-09-14 | End: 2019-09-14 | Stop reason: HOSPADM

## 2019-09-14 RX ORDER — GUAIFENESIN 100 MG/5ML
81 LIQUID (ML) ORAL DAILY
Status: DISCONTINUED | OUTPATIENT
Start: 2019-09-14 | End: 2019-09-14 | Stop reason: HOSPADM

## 2019-09-14 RX ORDER — TRAMADOL HYDROCHLORIDE 50 MG/1
50 TABLET ORAL
Status: DISCONTINUED | OUTPATIENT
Start: 2019-09-14 | End: 2019-09-14 | Stop reason: HOSPADM

## 2019-09-14 RX ORDER — ONDANSETRON 4 MG/1
8 TABLET, FILM COATED ORAL
Qty: 20 TAB | Refills: 0 | Status: SHIPPED | OUTPATIENT
Start: 2019-09-14 | End: 2019-09-24

## 2019-09-14 RX ORDER — LEVOFLOXACIN 500 MG/1
500 TABLET, FILM COATED ORAL DAILY
Qty: 7 TAB | Refills: 0 | Status: SHIPPED | OUTPATIENT
Start: 2019-09-14 | End: 2019-09-20

## 2019-09-14 RX ORDER — SODIUM CHLORIDE 0.9 % (FLUSH) 0.9 %
5-40 SYRINGE (ML) INJECTION EVERY 8 HOURS
Status: DISCONTINUED | OUTPATIENT
Start: 2019-09-14 | End: 2019-09-14 | Stop reason: HOSPADM

## 2019-09-14 RX ORDER — SODIUM CHLORIDE, SODIUM LACTATE, POTASSIUM CHLORIDE, CALCIUM CHLORIDE 600; 310; 30; 20 MG/100ML; MG/100ML; MG/100ML; MG/100ML
75 INJECTION, SOLUTION INTRAVENOUS CONTINUOUS
Status: DISCONTINUED | OUTPATIENT
Start: 2019-09-14 | End: 2019-09-14 | Stop reason: HOSPADM

## 2019-09-14 RX ORDER — FERROUS SULFATE, DRIED 160(50) MG
1 TABLET, EXTENDED RELEASE ORAL DAILY
Status: DISCONTINUED | OUTPATIENT
Start: 2019-09-14 | End: 2019-09-14 | Stop reason: HOSPADM

## 2019-09-14 RX ORDER — LEVOFLOXACIN 5 MG/ML
500 INJECTION, SOLUTION INTRAVENOUS EVERY 24 HOURS
Status: DISCONTINUED | OUTPATIENT
Start: 2019-09-14 | End: 2019-09-14 | Stop reason: HOSPADM

## 2019-09-14 RX ORDER — SODIUM CHLORIDE 0.9 % (FLUSH) 0.9 %
5-40 SYRINGE (ML) INJECTION AS NEEDED
Status: DISCONTINUED | OUTPATIENT
Start: 2019-09-14 | End: 2019-09-14 | Stop reason: HOSPADM

## 2019-09-14 RX ORDER — SODIUM CHLORIDE 0.9 % (FLUSH) 0.9 %
10 SYRINGE (ML) INJECTION
Status: COMPLETED | OUTPATIENT
Start: 2019-09-14 | End: 2019-09-14

## 2019-09-14 RX ORDER — ACETAMINOPHEN 325 MG/1
650 TABLET ORAL
Status: DISCONTINUED | OUTPATIENT
Start: 2019-09-14 | End: 2019-09-14 | Stop reason: HOSPADM

## 2019-09-14 RX ORDER — METOCLOPRAMIDE HYDROCHLORIDE 5 MG/ML
10 INJECTION INTRAMUSCULAR; INTRAVENOUS
Status: COMPLETED | OUTPATIENT
Start: 2019-09-14 | End: 2019-09-14

## 2019-09-14 RX ORDER — MECLIZINE HYDROCHLORIDE 25 MG/1
25 TABLET ORAL EVERY 6 HOURS
Qty: 40 TAB | Refills: 0 | Status: SHIPPED | OUTPATIENT
Start: 2019-09-14 | End: 2019-09-24

## 2019-09-14 RX ORDER — ISOSORBIDE MONONITRATE 30 MG/1
30 TABLET, EXTENDED RELEASE ORAL DAILY
Status: DISCONTINUED | OUTPATIENT
Start: 2019-09-14 | End: 2019-09-14 | Stop reason: HOSPADM

## 2019-09-14 RX ORDER — BISACODYL 5 MG
5 TABLET, DELAYED RELEASE (ENTERIC COATED) ORAL DAILY PRN
Status: DISCONTINUED | OUTPATIENT
Start: 2019-09-14 | End: 2019-09-14 | Stop reason: HOSPADM

## 2019-09-14 RX ORDER — MECLIZINE HCL 12.5 MG 12.5 MG/1
25 TABLET ORAL EVERY 6 HOURS
Status: DISCONTINUED | OUTPATIENT
Start: 2019-09-14 | End: 2019-09-14 | Stop reason: HOSPADM

## 2019-09-14 RX ORDER — ACETAMINOPHEN 325 MG/1
650 TABLET ORAL
Status: DISCONTINUED | OUTPATIENT
Start: 2019-09-14 | End: 2019-09-14 | Stop reason: SDUPTHER

## 2019-09-14 RX ORDER — BICALUTAMIDE 50 MG/1
50 TABLET, FILM COATED ORAL DAILY
Status: DISCONTINUED | OUTPATIENT
Start: 2019-09-14 | End: 2019-09-14 | Stop reason: HOSPADM

## 2019-09-14 RX ORDER — ONDANSETRON 2 MG/ML
4 INJECTION INTRAMUSCULAR; INTRAVENOUS
Status: DISCONTINUED | OUTPATIENT
Start: 2019-09-14 | End: 2019-09-14 | Stop reason: HOSPADM

## 2019-09-14 RX ORDER — IPRATROPIUM BROMIDE AND ALBUTEROL SULFATE 2.5; .5 MG/3ML; MG/3ML
3 SOLUTION RESPIRATORY (INHALATION)
Status: DISCONTINUED | OUTPATIENT
Start: 2019-09-14 | End: 2019-09-14 | Stop reason: HOSPADM

## 2019-09-14 RX ADMIN — GADOBUTROL 7 ML: 604.72 INJECTION INTRAVENOUS at 08:00

## 2019-09-14 RX ADMIN — CALCIUM CARBONATE-VITAMIN D TAB 500 MG-200 UNIT 1 TABLET: 500-200 TAB at 10:52

## 2019-09-14 RX ADMIN — Medication 10 ML: at 10:53

## 2019-09-14 RX ADMIN — HEPARIN SODIUM 5000 UNITS: 5000 INJECTION INTRAVENOUS; SUBCUTANEOUS at 10:52

## 2019-09-14 RX ADMIN — SODIUM CHLORIDE 40 MG: 9 INJECTION INTRAMUSCULAR; INTRAVENOUS; SUBCUTANEOUS at 10:52

## 2019-09-14 RX ADMIN — ISOSORBIDE MONONITRATE 30 MG: 30 TABLET ORAL at 10:52

## 2019-09-14 RX ADMIN — Medication 10 ML: at 10:20

## 2019-09-14 RX ADMIN — IOPAMIDOL 100 ML: 755 INJECTION, SOLUTION INTRAVENOUS at 10:20

## 2019-09-14 RX ADMIN — Medication 1 CAPSULE: at 10:52

## 2019-09-14 RX ADMIN — SODIUM CHLORIDE, SODIUM LACTATE, POTASSIUM CHLORIDE, AND CALCIUM CHLORIDE 75 ML/HR: 600; 310; 30; 20 INJECTION, SOLUTION INTRAVENOUS at 01:54

## 2019-09-14 RX ADMIN — ASPIRIN 81 MG 81 MG: 81 TABLET ORAL at 10:52

## 2019-09-14 RX ADMIN — METOCLOPRAMIDE 10 MG: 5 INJECTION, SOLUTION INTRAMUSCULAR; INTRAVENOUS at 00:16

## 2019-09-14 RX ADMIN — SODIUM CHLORIDE 100 ML: 900 INJECTION, SOLUTION INTRAVENOUS at 10:20

## 2019-09-14 RX ADMIN — FAMOTIDINE 20 MG: 10 INJECTION, SOLUTION INTRAVENOUS at 01:04

## 2019-09-14 NOTE — PROGRESS NOTES
TRANSFER - IN REPORT:    Verbal report received from [place](name) on [facility] [facility] [place].  being received from ER(unit) for routine progression of care      Report consisted of patients Situation, Background, Assessment and   Recommendations(SBAR). Information from the following report(s) SBAR, Kardex, [facility] and Recent Results was reviewed with the receiving nurse. Opportunity for questions and clarification was provided. Assessment completed upon patients arrival to unit and care assumed.

## 2019-09-14 NOTE — ED NOTES
Pt reports that \"he is not feeling as dizzy or nauseous at this time. \" ER MD made aware. Spouse going home to sleep after being informed of plan for patient admission.

## 2019-09-14 NOTE — ED NOTES
TRANSFER - OUT REPORT:    Verbal report given to Markie Coy on GI Track.  being transferred to [facility] ED(unit) for routine progression of care       Report consisted of patients Situation, Background, Assessment and   Recommendations(SBAR). Information from the following report(s) SBAR, Kardex, ED Summary, MAR, Recent Results and Cardiac Rhythm NSR was reviewed with the receiving nurse. Lines:   Peripheral IV 09/13/19 Left Forearm (Active)   Site Assessment Clean, dry, & intact 9/13/2019 10:01 PM   Phlebitis Assessment 0 9/13/2019 10:01 PM   Infiltration Assessment 0 9/13/2019 10:01 PM        Opportunity for questions and clarification was provided.       Patient transported with:   Monitor   [facility]

## 2019-09-14 NOTE — ED PROVIDER NOTES
The patient presents to the ED with nausea, vomiting, and dizziness. Symptoms began today 4 PM while riding in the car. He feels very dizzy. Certain movements make the symptoms worse - especially looking to to the left. The patient denies any numbness or weakness. No difficulty speaking or swallowing. He has hx of the same and has been diagnosed with vertigo and seen Dr. Inez Abarca and another ENT [facility]. \" He also has nausea and has vomited multiple times. He denies any abdominal pain. He was given Zofran by EMS. Of note, He is scheduled for a TURP on 9/19 for prostate cancer at [facility] by Dr. Kristine Rosales. He had pre-op labs done on 9/11. He has not received the urine culture results yet. The history is provided by the patient and the spouse.         Past Medical History:   Diagnosis Date    Arthritis     Asthma     CAD (coronary artery disease)     Cancer ([facility]     Prostate    Chronic obstructive pulmonary disease (HCC)     Chronic pain     Essential hypertension     Pt denies    Hyperlipidemia        Past Surgical History:   Procedure Laterality Date    HX CORONARY ARTERY BYPASS GRAFT      CABG x 5    HX GI      Hernia Surgery x 3    HX HEART CATHETERIZATION      HX HEENT      Bilateral Cataract surgery    HX HEENT      Tonsils and Adenoids    HX ORTHOPAEDIC      Bilateral Hip replacement         Family History:   Problem Relation Age of Onset    Heart Disease Mother     Cancer Father        Social History     Socioeconomic History    Marital status:      Spouse name: Not on file    Number of children: Not on file    Years of education: Not on file    Highest education level: Not on file   Occupational History    Not on file   Social Needs    Financial resource strain: Not on file    Food insecurity:     Worry: Not on file     Inability: Not on file    Transportation needs:     Medical: Not on file     Non-medical: Not on file   Tobacco Use    Smoking status: Never Smoker    Smokeless tobacco: Never Used   Substance and Sexual Activity    Alcohol use: Not Currently     Alcohol/week: 0.0 standard drinks     Comment: occasional drink at night 2-3 times per week    Drug use: No    Sexual activity: Not on file   Lifestyle    Physical activity:     Days per week: Not on file     Minutes per session: Not on file    Stress: Not on file   Relationships    Social connections:     Talks on phone: Not on file     Gets together: Not on file     Attends Orthodoxy service: Not on file     Active member of club or organization: Not on file     Attends meetings of clubs or organizations: Not on file     Relationship status: Not on file    Intimate partner violence:     Fear of current or ex partner: Not on file     Emotionally abused: Not on file     Physically abused: Not on file     Forced sexual activity: Not on file   Other Topics Concern    Not on file   Social History Narrative    Not on file         ALLERGIES: Lipitor [atorvastatin] and Sulfa (sulfonamide antibiotics)    Review of Systems   Constitutional: Negative for appetite change and fever. HENT: Negative for congestion, nosebleeds and sore throat. Eyes: Negative for discharge. Respiratory: Negative for cough and shortness of breath. Cardiovascular: Negative for chest pain. Gastrointestinal: Positive for nausea and vomiting. Negative for abdominal pain and diarrhea. Genitourinary: Negative for dysuria. Musculoskeletal: Negative. Skin: Negative for rash. Neurological: Negative for weakness and headaches. Hematological: Negative for adenopathy. Psychiatric/Behavioral: Negative. All other systems reviewed and are negative. Vitals:    09/13/19 2152   BP: 191/80   Pulse: 88   Resp: 20   Temp: 98.1 °F (36.7 °C)   SpO2: 95%   Weight: 71.3 kg (157 lb 3 oz)   Height: 5' 7\" (1.702 m)            Physical Exam   Constitutional: He is oriented to person, place, and time.  He appears well-developed and well-nourished. HENT:   Head: Normocephalic and atraumatic. Mouth/Throat: Oropharynx is clear and moist.   Cerumen impaction on the right   Eyes: Pupils are equal, round, and reactive to light. Conjunctivae and EOM are normal.   Neck: Normal range of motion. Neck supple. Cardiovascular: Normal rate, regular rhythm and normal heart sounds. Pulmonary/Chest: Effort normal and breath sounds normal.   Abdominal: Soft. Bowel sounds are normal. There is no tenderness. Genitourinary:   Genitourinary Comments: Indwelling cruz catheter in place   Musculoskeletal: Normal range of motion. He exhibits no edema or tenderness. Neurological: He is alert and oriented to person, place, and time. Skin: Skin is warm and dry. Psychiatric: He has a normal mood and affect. His behavior is normal.   Nursing note and vitals reviewed. MDM       Procedures    ED EKG interpretation:  Rhythm: normal sinus rhythm; and regular . Rate (approx.): 80; Axis:left axis deviation; P wave: normal; QRS interval: normal ; ST/T wave: normal; interpreted by Miguelito Hendricks MD      Urine culture on 9/11 grew Klebsiella - sensitive to Levofloxacin. 1:00 AM  Patient with continued dizziness and nausea. Will get head CT and admit. A/P:  1. 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 Intractable nausea  3. UTI - Levofloxacin given IV because of nausea. No SIRS to warrant blood cultures. Hospitalist Sherly for Admission  1:04 AM - Dr. Sanya Greer    ED Room Number: SER07/07  Patient Name and age:  Susana Robledo. 80 y.o.  male  Working Diagnosis:   1. Vertigo    2. Intractable vomiting with nausea, unspecified vomiting type    3. Urinary tract infection without hematuria, site unspecified      Readmission: no  Isolation Requirements:  yes - ESBL in urine  Recommended Level of Care:  telemetry  Code Status:  Full Code  Department:[facility] - [phone]  Other:  Urine culture on 9/11 with Klebsiella.  Had not been treated. 1:54 AM  Dr. America Mccallum - [facility] ED - standard discussion. Made aware that the patient will be boarding in the ED.

## 2019-09-14 NOTE — ROUTINE PROCESS
TRANSFER - OUT REPORT:    Verbal report given to WERNER Akins(name) on [address].  being transferred to (unit) for routine progression of care       Report consisted of patients Situation, Background, Assessment and   Recommendations(SBAR). Information from the following report(s) SBAR, ED Summary, Procedure Summary and Recent Results was reviewed with the receiving nurse. Lines:   Peripheral IV 09/13/19 Left Forearm (Active)   Site Assessment Clean, dry, & intact 9/13/2019 10:01 PM   Phlebitis Assessment 0 9/13/2019 10:01 PM   Infiltration Assessment 0 9/13/2019 10:01 PM        Opportunity for questions and clarification was provided.       Patient transported with:   [employer]

## 2019-09-14 NOTE — DISCHARGE INSTRUCTIONS
Discharge Instructions       PATIENT ID: Yohana Eden. MRN: [id number]   YOB: 1931    DATE OF ADMISSION: 9/13/2019  9:49 PM    DATE OF DISCHARGE: 9/14/2019    PRIMARY CARE PROVIDER: Jena Hernandez MD     ATTENDING PHYSICIAN: Gabriela Snow*  DISCHARGING PROVIDER: Bret Varela MD    To contact this individual call [phone] and ask the  to page. If unavailable ask to be transferred the Adult Hospitalist Department. DISCHARGE DIAGNOSES   Dizziness, nausea and vomiting consistent with vertigo  UTI   Prostate cancer    CONSULTATIONS: IP CONSULT TO UROLOGY    PROCEDURES/SURGERIES: * No surgery found *    PENDING TEST RESULTS:   At the time of discharge the following test results are still pending: none    FOLLOW UP APPOINTMENTS:   Follow-up Information     Follow up With Specialties Details Why Contact Info    Jena Hernandez MD [facility] In 2 weeks  [address] [facility]  [address]  [facility]      Marychuy Irvin MD Urology In 1 week Dr. Paola Abarca should call you regarding your culture result on Monday [address] [facility]  [address] [place] [address]  [phone]      Please make an appointment with your ENT physician               ADDITIONAL CARE RECOMMENDATIONS:   1. Please take all medications as prescribed. Note changes as below. - Take meclizine PRN for your dizziness. - Take zofran PRN for your nausea  - Start an antibiotic and continue until you follow up with Dr. Paola Abarca. 2. Please make sure to follow up with your primary care physician within 1-2 weeks of discharge for hospital follow up. You also need to follow up with Dr. Paola Abarca, and your ENT physician. 3. Please get up slowly from a seated or laying position  4. Remain well hydrated. 5. Avoid tobacco, alcohol and other illicit drug use.        DIET: Resume previous diet    ACTIVITY: Activity as tolerated    WOUND CARE: None    EQUIPMENT needed: None      DISCHARGE MEDICATIONS:   See Medication Reconciliation Form    · It is important that you take the medication exactly as they are prescribed. · Keep your medication in the bottles provided by the pharmacist and keep a list of the medication names, dosages, and times to be taken in your wallet. · Do not take other medications without consulting your doctor. NOTIFY YOUR PHYSICIAN FOR ANY OF THE FOLLOWING:   Fever over 101 degrees for 24 hours. Chest pain, shortness of breath, fever, chills, nausea, vomiting, diarrhea, change in mentation, falling, weakness, bleeding. Severe pain or pain not relieved by medications. Or, any other signs or symptoms that you may have questions about. DISPOSITION:  X  Home With:   OT  PT  HH  RN       SNF/Inpatient Rehab/LTAC    Independent/assisted living    Hospice    Other:     CDMP Checked: Yes X     PROBLEM LIST Updated:   Yes X       Signed:   Jose D Sanchez MD  9/14/2019  1:16 PM

## 2019-09-14 NOTE — ED NOTES
Patient rounded on at this time. RN reassessed patient's pain at this time. Pt declined the need to use the potty but states that they will make RN aware if the need arises. Pt belongings (including phone) within reach at this time. Patient reports that they are in a position of comfort at this time. Call light is within reach. Bed in low position. Side rails up x2. Wheels locked. RN made patient aware that they will be updated on plan of care as it evolves and staff will be back within the hour to check on them.

## 2019-09-14 NOTE — ED NOTES
Orthostatics held at this time until patient becomes less dizzy with position changes r/t vomiting when patient is moved in ED bed per ER MD.

## 2019-09-14 NOTE — ED NOTES
1552:  Patient arrives from [employer] as transfer admit to tele. Patient was able to transfer from EMS [facility] to hospital [facility] without assist.  Patient answers all questions appropriately, A&O x4, [place]. Report received from [facility], [address] [place] [address]  [id number]:  Hospitalist at bedside.

## 2019-09-14 NOTE — ED NOTES
[facility] called at this time to arrange transport for patient going to [facility]. ETA 1hour.

## 2019-09-14 NOTE — DISCHARGE SUMMARY
Discharge Summary       PATIENT ID: Evin Moreno. MRN: [id number]   YOB: 1931    DATE OF ADMISSION: 9/13/2019  9:49 PM    DATE OF DISCHARGE: 09/14/2019   PRIMARY CARE PROVIDER: Yuliana Gutierrez MD     DISCHARGING PROVIDER: Tracy Trejo MD    To contact this individual call [phone] and ask the  to page. If unavailable ask to be transferred the Adult Hospitalist Department. CONSULTATIONS: IP CONSULT TO UROLOGY  IP CONSULT TO NEUROLOGY    PROCEDURES/SURGERIES: * No surgery found *    ADMITTING DIAGNOSES & HOSPITAL COURSE:   Dizziness, nausea and vomiting consistent with vertigo  UTI   Prostate cancer     This is an 19-year-old man with a past medical history significant for prostate cancer; dyslipidemia; COPD; coronary artery disease, status post CABG; chronic indwelling Conrad catheter; was in his usual state of health until the day of his presentation at the emergency room when the patient developed dizziness. He has a known history of vertigo, and felt like the room was spinning. He did have associated nausea and vomiting, which he usually does not have. He is scheduled for TURP on 9/19 with urology for his prostate cancer. UA was positive for ESBL Klebsiella which is sensitive to levofloxacin. Discussed with urology, and will discharge on levofloxacin. He had MRI, MRA, and CTA all of which were negative, and no sign of basilar artery stenosis or CVA. N/V, and dizziness have improved, and he is stable for discharge.          DISCHARGE DIAGNOSES / PLAN:      Vertigo, N/V  - PRN meclizine for home, PRN zofran  - ENT outpatient follow up  - MRA, MRI, and CTA all negative    Klebsiella UTI with chronic indwelling catheter- UA and culture results from o/p appt on 9/11  - Discharge on oral levofloxacin and follow up with Dr. Kathy Palacios urology on Monday  - Conrad was changed in ED on 9/14    Hyponatremia - mild, s/p IVF  HLD - home meds  COPD - home meds  CAD s/p CABG - home meds.        ADDITIONAL CARE RECOMMENDATIONS:  1. Please take all medications as prescribed. Note changes as below. - Take meclizine PRN for your dizziness. - Take zofran PRN for your nausea  - Start an antibiotic and continue until you follow up with Dr. Anthony Comer. 2. Please make sure to follow up with your primary care physician within 1-2 weeks of discharge for hospital follow up. You also need to follow up with Dr. Anthony Comer, and your ENT physician. 3. Please get up slowly from a seated or laying position  4. Remain well hydrated. 5. Avoid tobacco, alcohol and other illicit drug use. PENDING TEST RESULTS:   At the time of discharge the following test results are still pending: None    FOLLOW UP APPOINTMENTS:    Follow-up Information     Follow up With Specialties Details Why Contact Info    Jeremy Gonzalez MD [facility] In 2 weeks  [address] [facility]  [address] [facility]      Jina Zuñiga MD Urology In 1 week Dr. Anthony Comer should call you regarding your culture result on Monday [phone] [facility] [phone]      Please make an appointment with your ENT physician                 DIET: Resume previous diet    ACTIVITY: Activity as tolerated    WOUND CARE: None    EQUIPMENT needed: None      DISCHARGE MEDICATIONS:  Current Discharge Medication List      START taking these medications    Details   meclizine (ANTIVERT) 25 mg tablet Take 1 Tab by mouth every six (6) hours for 10 days. Qty: 40 Tab, Refills: 0      pantoprazole (PROTONIX) 40 mg tablet Take 1 Tab by mouth daily for 30 days. Qty: 30 Tab, Refills: 0      ondansetron hcl (ZOFRAN) 4 mg tablet Take 2 Tabs by mouth every eight (8) hours as needed for Nausea for up to 10 days. Qty: 20 Tab, Refills: 0      levoFLOXacin (LEVAQUIN) 500 mg tablet Take 1 Tab by mouth daily for 7 days.   Qty: 7 Tab, Refills: 0         CONTINUE these medications which have NOT CHANGED    Details   traMADol (ULTRAM) 50 mg tablet Take 50 mg by mouth every six (6) hours as needed for Pain. oxyCODONE IR (ROXICODONE) 5 mg immediate release tablet Take 5 mg by mouth nightly. mometasone (ASMANEX TWISTHALER) 220 mcg (120 doses) aepb inhaler Take  by inhalation every evening. bicalutamide (CASODEX) 50 mg tablet Take 50 mg by mouth daily. calcium citrate-vitamin D3 (CITRACAL + D) tablet Take  by mouth two (2) times a day. isosorbide mononitrate ER (IMDUR) 30 mg tablet Take 1 Tab by mouth daily for 360 days. Qty: 90 Tab, Refills: 2    Associated Diagnoses: Substernal chest pain; Atherosclerosis of native coronary artery of native heart with other form of angina pectoris (HCC)      simvastatin (ZOCOR) 10 mg tablet TAKE ONE TABLET BY MOUTH EVERY NIGHT AT BEDTIME  Qty: 90 Tab, Refills: 3      albuterol (PROVENTIL HFA, VENTOLIN HFA, PROAIR HFA) 90 mcg/actuation inhaler Take 1 Puff by inhalation two (2) times daily as needed for Wheezing. Associated Diagnoses: Atherosclerosis of native coronary artery of native heart without angina pectoris; Pure hypercholesterolemia; Dizziness; Nausea      beclomethasone (QVAR) 40 mcg/actuation inhaler Take 2 Puffs by inhalation two (2) times a day. Associated Diagnoses: Coronary atherosclerosis of native coronary artery; Dizziness      aspirin 81 mg chewable tablet Take 1 Tab by mouth daily. Qty: 30 Tab, Refills: 11               NOTIFY YOUR PHYSICIAN FOR ANY OF THE FOLLOWING:   Fever over 101 degrees for 24 hours. Chest pain, shortness of breath, fever, chills, nausea, vomiting, diarrhea, change in mentation, falling, weakness, bleeding. Severe pain or pain not relieved by medications. Or, any other signs or symptoms that you may have questions about.     DISPOSITION:  X  Home With:   OT  PT  HH  RN       Long term SNF/Inpatient Rehab    Independent/assisted living    Hospice    Other:       PATIENT CONDITION AT DISCHARGE:     Functional status    Poor Deconditioned    X Independent      Cognition    X Lucid     Forgetful     Dementia      Catheters/lines (plus indication)   X Conrad (chronic, prostate CA)    PICC     PEG     None      Code status   X  Full code     DNR      PHYSICAL EXAMINATION AT DISCHARGE:  Visit Vitals  /49 (BP 1 Location: Right arm, BP Patient Position: At rest)   Pulse 85   Temp 98.3 °F (36.8 °C)   Resp 18   Ht 5' 7\" (1.702 m)   Wt 71.3 kg (157 lb 3 oz)   SpO2 98%   BMI 24.62 kg/m²     Gen: NAD, awake in bed  HEENT: NC/AT, sclera anicteric, PERRL, EOMI  CV: RRR no m/r/g, sternotomy scar  Resp: CTA b/l no increased work of breathing  Abd: NT/ND, normal bowel sounds  Ext: 2+ pulses, no edema  : Conrad in place  Skin: No rashes        CHRONIC MEDICAL DIAGNOSES:  Problem List as of 9/14/2019 Date Reviewed: 9/14/2019          Codes Class Noted - Resolved    Coronary artery disease with stable angina pectoris ([facility] ICD-10-CM: I25.118  ICD-9-CM: 414.00, 413.9  5/17/2019 - Present        Coronary artery disease involving native coronary artery of native heart without angina pectoris ICD-10-CM: I25.10  ICD-9-CM: 414.01  10/13/2016 - Present        * (Principal) Dizziness ICD-10-CM: R42  ICD-9-CM: 780.4  10/13/2016 - Present        Mixed hyperlipidemia ICD-10-CM: E78.2  ICD-9-CM: 272.2  10/13/2016 - Present        Shortness of breath ICD-10-CM: R06.02  ICD-9-CM: 786.05  6/22/2012 - Present        Coronary atherosclerosis of native coronary artery ICD-10-CM: I25.10  ICD-9-CM: 414.01  6/22/2012 - Present        Hyperlipidemia ICD-10-CM: E78.5  ICD-9-CM: 272.4  6/9/2009 - Present        RESOLVED: Aortic stenosis, moderate ICD-10-CM: I35.0  ICD-9-CM: 424.1  10/13/2016 - 10/13/2016              Greater than 30 minutes were spent with the patient on counseling and coordination of care    Signed:   Bernice Pringle MD  9/14/2019  1:03 PM

## 2019-09-14 NOTE — ED NOTES
Patient being transported out via stretcher accompanied by [facility] in no sign of distress or discomfort.

## 2019-09-14 NOTE — H&P
[address] [facility]  HISTORY AND PHYSICAL    Name:  Chad Arellano  MR#:  [id number]  :  1931  ACCOUNT #:  [de-identified]  ADMIT DATE:  2019    The patient was seen, evaluated and admitted by me on 2019. PRIMARY CARE PHYSICIAN:  Ashley Mota MD.    SOURCE OF INFORMATION:  The patient. CHIEF COMPLAINT:  Dizziness. HISTORY OF PRESENT ILLNESS:  This is an 55-year-old man with a past medical history significant for prostate cancer; dyslipidemia; COPD; coronary artery disease, status post CABG; chronic indwelling Conrad catheter; was in his usual state of health until the day of his presentation at the emergency room when the patient developed dizziness. The patient described the dizziness as if the room was spinning around him. No difficulty with speech. The patient stated that he has had dizziness in the past and he has been seen by ENT for evaluation of the dizziness. The outcome of the evaluation is not known, but the dizziness today is different in the sense that it is associated with nausea and vomiting. The nausea and vomiting are persistent. No known aggravating or relieving factors. The patient has prostate cancer and is scheduled for prostate surgery at [facility] on 2019 by Dr. Sravani Spain, urologist.  The patient has had preoperative lab work done including urinalysis. The urine is growing Klebsiella for which the patient has not been treated. The patient was taken to [facility] Room at [facility] for further evaluation. When the patient arrived at the emergency room, the patient was treated conservatively for the nausea and vomiting as well as for the dizziness without any significant improvement. The patient was subsequently referred to the hospitalist service for evaluation for admission. The patient was last admitted to the hospital in 2010 and underwent CABG for coronary artery disease.   The patient denies fever, rigors and chills. PAST MEDICAL HISTORY:  1.  Prostate cancer. 2.  Dyslipidemia. 3.  COPD. 4.  Coronary artery disease, status post CABG. 5.  Chronic indwelling Conrad catheter. ALLERGIES:  THE PATIENT IS ALLERGIC TO LIPITOR AND SULFA. CURRENT MEDICATIONS:  1. Albuterol 90 mcg inhalation twice daily as needed for wheezing. 2.  Aspirin 81 mg daily. 3.  Qvar 40 mcg 2 puffs by inhalation twice daily. 4.  Casodex 50 mg daily. 5.  Imdur 30 mg daily. 6.  Asmanex 220 mcg inhalation daily. 7.  Oxycodone IR 5 mg daily at bedtime. 8.  Zocor 10 mg daily at bedtime. 9.  Tramadol 50 mg every 6 hours as needed for pain. FAMILY HISTORY:  This was reviewed. His mother had heart disease. His father had cancer, the type of cancer is not known. PAST SURGICAL HISTORY:  This is significant for CABG, bilateral cataract extractions, bilateral hip replacements. SOCIAL HISTORY:  No history of alcohol or tobacco abuse. REVIEW OF SYSTEMS:  HEAD, EYES, EARS, NOSE AND THROAT:  This is positive for dizziness. No headache, no blurring of vision, no photophobia. RESPIRATORY SYSTEM:  No shortness of breath, no cough, no hemoptysis. CARDIOVASCULAR SYSTEM:  No chest pain, no orthopnea, no palpitation. GASTROINTESTINAL SYSTEM:  This is positive for nausea and vomiting. No diarrhea, no constipation, no abdominal pain. GENITOURINARY SYSTEM:  No dysuria, no urgency and no frequency. All other systems are reviewed and they are negative. PHYSICAL EXAMINATION:  GENERAL APPEARANCE:  The patient appeared ill, in moderate distress. VITAL SIGNS:  On arrival at the emergency room, temperature 98.1, pulse 88, respiratory rate 20, blood pressure 191/80, oxygen saturation 95% on room air. HEAD:  Normocephalic, atraumatic. EYES:  Normal eye movements. No redness, no drainage, no discharge. EARS:  Normal external ears with no evidence of drainage. NOSE:  No deformity, no drainage.   MOUTH AND THROAT:  No visible oral lesion. Dry oral mucosa. NECK:  Neck is supple. No JVD, no thyromegaly. CHEST:  Clear breath sounds. No wheezing, no crackles. HEART:  Normal S1 and S2, regular. No clinically appreciable murmur. ABDOMEN:  Soft, nontender, normal bowel sounds. CNS:  Alert, oriented x3. No gross focal neurological deficits. EXTREMITIES:  No edema. Pulses 2+ bilaterally. MUSCULOSKELETAL SYSTEM:  No evidence of joint deformity or swelling. SKIN:  No active skin lesions seen in the exposed parts of the body. PSYCHIATRY:  Normal mood and affect. LYMPHATIC SYSTEM:  No cervical lymphadenopathy. DIAGNOSTIC DATA:  CT scan of the head without contrast shows bifrontal volume loss, vertebral basilar calcification can be associated with vertebrobasilar insufficiency syndrome. LABORATORY DATA:  Chemistry:  Sodium 131, potassium 4.0, chloride 94, CO2 of 24, glucose 157, BUN 12, creatinine 0.90, calcium 9.2, total bilirubin 0.9, ALT 19, AST 16, alkaline phosphatase 149, total protein at 7.0, albumin level 3.4, globulin at 3.6. Cardiac profile, troponin less than 0.05. Hematology:  WBC 7.7, hemoglobin at 12.2, hematocrit 37.5, platelets 368. ASSESSMENT:  1.  Dizziness. 2.  Intractable nausea and vomiting. 3.  Klebsiella urinary tract infection. 4.  Hyponatremia. 5.  Prostate cancer. 6.  Dyslipidemia. 7.  Chronic obstructive pulmonary disease. 8.  Chronic indwelling Conrad catheter. 9.  Hyperglycemia. 10.  Coronary artery disease, status post coronary artery bypass grafting. 11.  Thrombocytopenia. PLAN:  1. Dizziness. We will admit the patient for further evaluation and treatment. We will obtain MRI of the brain with and without contrast.  We will also obtain MRA of the brain as well as MRA of the neck for further evaluation of the dizziness. Inpatient Neurology consult will be requested to assist in further evaluation and treatment.   We will check ESR and C-reactive protein level to evaluate the patient for systemic inflammation. 2.  Intractable nausea and vomiting. This could be related to the dizziness. We will start the patient on Protonix. Gastroenterology consult will be requested. The patient may benefit from EGD to evaluate the patient for gastritis or peptic ulcer disease. 3.  Klebsiella urinary tract infection. This was present on admission. We will start the patient on Levaquin. The Klebsiella is sensitive to Levaquin. 4.  Hyponatremia. This is most likely due to volume depletion. We will carry out fluid therapy and repeat sodium level. 5.  Prostate cancer. Continue with preadmission medication. The patient is scheduled for prostate surgery in a couple of days. The patient's urologist has been consulted. We will await further recommendation. 6  Dyslipidemia. We will continue with preadmission medication. We will check a lipid profile. 7.  Chronic obstructive pulmonary disease. We will continue with DuoNeb as needed. 8.  Chronic indwelling Conrad catheter. This is most likely related to the patient's prostate cancer. The Conrad catheter was changed in the emergency room. The previous Conrad catheter has been there for about a month. The Klebsiella urinary tract infection is most likely catheter associated. The patient will be started on Levaquin as stated above. 9.  Hyperglycemia. We will check hemoglobin A1c level. The patient has no history of diabetes. 10.  Coronary artery disease, status post coronary artery bypass grafting. We will check cardiac markers to rule out acute myocardial infarction. We will obtain EKG. 11.  Thrombocytopenia. This is mild. The patient is asymptomatic. We will monitor the patient's platelet count. OTHER ISSUES:  Code status, the patient is a full code. We will place the patient on heparin for DVT prophylaxis.   If there is further drop in the patient's platelet counts, we will discontinue heparin and request SCD for DVT prophylaxis. FUNCTIONAL STATUS PRIOR TO ADMISSION:  The patient is ambulatory without assistant or device.       Chad Salmeron MD      [id number]  D:  09/14/2019 7:42  T:  09/14/2019 7:53  JOB #:  [id number]  CC:  Harlan Christianson MD

## 2019-09-15 LAB
ATRIAL RATE: 80 BPM
CALCULATED P AXIS, ECG09: 43 DEGREES
CALCULATED R AXIS, ECG10: -42 DEGREES
CALCULATED T AXIS, ECG11: 55 DEGREES
DIAGNOSIS, 93000: NORMAL
P-R INTERVAL, ECG05: 130 MS
Q-T INTERVAL, ECG07: 382 MS
QRS DURATION, ECG06: 104 MS
QTC CALCULATION (BEZET), ECG08: 440 MS
VENTRICULAR RATE, ECG03: 80 BPM

## 2019-09-16 LAB
BACTERIA SPEC CULT: ABNORMAL
CC UR VC: ABNORMAL
SERVICE CMNT-IMP: ABNORMAL

## 2019-09-16 NOTE — H&P
9/19/19  H&P    Salvatore Hinds is an 80year old White male with urinary retention and ACP on Lupron. Recent UTI on Levaquin. Prostate Cancer  Diagnosis Date:  08/14/2019  Prior Status:   Newly diagnosed disease  Pretreatment PSA:  618.5 ng/ml  Total (+) Cores:   4/4  Highest Core% Involved: 100%  Prostate volume: 69.85 ccs   Clinical Stage:   T3    08/14/2019 TRUS BIOPSY RESULTS:  Right Lateral Base:  4+4=8 (70%)  Right Lateral Mid:  4+5=9 (100%)  Left Lateral Base:  4+4=8 (70%)  Left Lateral Mid:  4+4=8 (70%)    PROSTATE CANCER TREATMENT HISTORY:  First Treatment: Androgen deprivation therapy-Casodex - 08/16/2019   Second Treatment: Androgen deprivation therapy-Lupron - 09/06/2019     IMAGING RESULTS:   Bone Scan 08/15/2019 - Suspicious  He is here with his wife. He is doing better and only needing to pain medications pills a day. Also taking Casodex and had his Lupron shot. He saw his PCP, that was reviewed. He was given stool softener and some oxycodone. He still is having problems with constipation but states his pain is better. All labs okay except for mild anemia. He has not had a DEXA scan. Upcoming channel TURP is scheduled. PAST MEDICAL HISTORY:    Allergies: SULFA (SULFADIAZINE) (Severe)  BACTRIM (SULFAMETHOXAZOLE-TRIMETHOPRIM) (Severe)  DENIES: Latex, Shellfish, X-Ray Dye, Iodine.      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 DENIES: Pacemaker/Defibrillator, Diabetes, High Blood Pressure, Bowel Problems, Stroke/Seizure, Kidney Problems, Bleeding Problems, HIV, Hepatitis, or Cancer. Surgeries: CABG 2010, Joint Replacement Surgery, Open Heart Surgery, Hernia Repair, and Cataract Surgery. Family History: Kidney Disease. DENIES: Prostate cancer, Kidney cancer, Kidney stones. Social History: Retired. Other. Smoking status: never smoker. Drinks alcohol 2 to 4 times a month. System Review: Admits to: Shortness of Breath, Involuntary Urine Loss, Impaired Sex Drive, and Easy Bleeding. DENIES: Unexplained Weight Loss, Dry Eyes, Dry Mouth, Leg Swelling, Constipation, Lower Extremity Weakness, Dry Skin, Difficulty Walking, Psychiatric Problems, Rash. URINALYSIS    PSA HISTORY  618.5 ng/ml on 08/07/2019    IMPRESSION:    1. PROSTATE ADENOCARCINOMA (ICD-185) - Unchanged: Disease palliated with hormone therapy. Thoroughly discussed. He will keep taking his pain medicine as necessary, continue the Casodex, add Citracal.  We will get a DEXA scan. He will need a PSA and testosterone at the 3-month home and may need referral to [facility] . Repeat bone scan planned for equivocal but highly suspicious findings. 2. URINARY RETENTION (UIG38-K33.9) - Unchanged  Channel TURP at the hospital with possible 23-hour stay again discussed.

## 2019-09-16 NOTE — PERIOP NOTES
[facility] office and informed of pt being seen in the ER at [facility] on 9/14/2019 after being seen by PAT 9/11/2019, was put on Levaquin 500 mg daily x 7 days for positive Urine Culture by [facility] ER Physician. (See notes in [address]) for further information). [facility] states she will inform Dr Yoav Mendoza of above information.  Galina CALDERA

## 2019-09-17 NOTE — PERIOP NOTES
LM for Dr Grossman's office about pt being evaluated by them yesterday after ER visit this past weekend for vertigo. Phone number provided.

## 2019-09-19 ENCOUNTER — ANESTHESIA (OUTPATIENT)
Dept: SURGERY | Age: 84
End: 2019-09-19
Payer: MEDICARE

## 2019-09-19 ENCOUNTER — HOSPITAL ENCOUNTER (OUTPATIENT)
Age: 84
Setting detail: OBSERVATION
Discharge: HOME | End: 2019-09-20
Attending: UROLOGY | Admitting: UROLOGY
Payer: MEDICARE

## 2019-09-19 ENCOUNTER — ANESTHESIA EVENT (OUTPATIENT)
Dept: SURGERY | Age: 84
End: 2019-09-19
Payer: MEDICARE

## 2019-09-19 PROBLEM — R33.9 RETENTION OF URINE: Status: ACTIVE | Noted: 2019-09-19

## 2019-09-19 PROBLEM — C61 PROSTATE CANCER (HCC): Status: ACTIVE | Noted: 2019-09-19

## 2019-09-19 PROCEDURE — 76210000006 HC OR PH I REC 0.5 TO 1 HR: Performed by: UROLOGY

## 2019-09-19 PROCEDURE — 77030018836 HC SOL IRR NACL ICUM -A

## 2019-09-19 PROCEDURE — 77030028158 HC ELECTRD BISOLSR PROST RWOL -B: Performed by: UROLOGY

## 2019-09-19 PROCEDURE — 74011250636 HC RX REV CODE- 250/636: Performed by: UROLOGY

## 2019-09-19 PROCEDURE — 99218 HC RM OBSERVATION: CPT

## 2019-09-19 PROCEDURE — 77030010545: Performed by: UROLOGY

## 2019-09-19 PROCEDURE — 74011250636 HC RX REV CODE- 250/636: Performed by: ANESTHESIOLOGY

## 2019-09-19 PROCEDURE — 74011000250 HC RX REV CODE- 250: Performed by: NURSE ANESTHETIST, CERTIFIED REGISTERED

## 2019-09-19 PROCEDURE — 77030018836 HC SOL IRR NACL ICUM -A: Performed by: UROLOGY

## 2019-09-19 PROCEDURE — 74011250637 HC RX REV CODE- 250/637: Performed by: UROLOGY

## 2019-09-19 PROCEDURE — 77030005546 HC CATH URETH FOL 3W BARD -A: Performed by: UROLOGY

## 2019-09-19 PROCEDURE — 77030011640 HC PAD GRND REM COVD -A: Performed by: UROLOGY

## 2019-09-19 PROCEDURE — 77030018846 HC SOL IRR STRL H20 ICUM -A: Performed by: UROLOGY

## 2019-09-19 PROCEDURE — 88305 TISSUE EXAM BY PATHOLOGIST: CPT

## 2019-09-19 PROCEDURE — 74011000258 HC RX REV CODE- 258: Performed by: UROLOGY

## 2019-09-19 PROCEDURE — 76010000149 HC OR TIME 1 TO 1.5 HR: Performed by: UROLOGY

## 2019-09-19 PROCEDURE — 74011250636 HC RX REV CODE- 250/636: Performed by: NURSE ANESTHETIST, CERTIFIED REGISTERED

## 2019-09-19 PROCEDURE — 77030040361 HC SLV COMPR DVT MDII -B: Performed by: UROLOGY

## 2019-09-19 PROCEDURE — 76060000033 HC ANESTHESIA 1 TO 1.5 HR: Performed by: UROLOGY

## 2019-09-19 RX ORDER — ISOSORBIDE MONONITRATE 30 MG/1
30 TABLET, EXTENDED RELEASE ORAL DAILY
Status: DISCONTINUED | OUTPATIENT
Start: 2019-09-20 | End: 2019-09-20 | Stop reason: HOSPADM

## 2019-09-19 RX ORDER — TRAMADOL HYDROCHLORIDE 50 MG/1
50 TABLET ORAL
Status: DISCONTINUED | OUTPATIENT
Start: 2019-09-19 | End: 2019-09-20 | Stop reason: HOSPADM

## 2019-09-19 RX ORDER — SODIUM CHLORIDE, SODIUM LACTATE, POTASSIUM CHLORIDE, CALCIUM CHLORIDE 600; 310; 30; 20 MG/100ML; MG/100ML; MG/100ML; MG/100ML
25 INJECTION, SOLUTION INTRAVENOUS CONTINUOUS
Status: DISCONTINUED | OUTPATIENT
Start: 2019-09-19 | End: 2019-09-19 | Stop reason: HOSPADM

## 2019-09-19 RX ORDER — ATROPA BELLADONNA AND OPIUM 16.2; 6 MG/1; MG/1
1 SUPPOSITORY RECTAL
Status: DISCONTINUED | OUTPATIENT
Start: 2019-09-19 | End: 2019-09-20 | Stop reason: HOSPADM

## 2019-09-19 RX ORDER — SODIUM CHLORIDE 0.9 % (FLUSH) 0.9 %
5-40 SYRINGE (ML) INJECTION AS NEEDED
Status: DISCONTINUED | OUTPATIENT
Start: 2019-09-19 | End: 2019-09-19 | Stop reason: HOSPADM

## 2019-09-19 RX ORDER — PANTOPRAZOLE SODIUM 40 MG/1
40 TABLET, DELAYED RELEASE ORAL DAILY
Status: DISCONTINUED | OUTPATIENT
Start: 2019-09-20 | End: 2019-09-20 | Stop reason: HOSPADM

## 2019-09-19 RX ORDER — ONDANSETRON 2 MG/ML
4 INJECTION INTRAMUSCULAR; INTRAVENOUS AS NEEDED
Status: DISCONTINUED | OUTPATIENT
Start: 2019-09-19 | End: 2019-09-19 | Stop reason: HOSPADM

## 2019-09-19 RX ORDER — SODIUM CHLORIDE 0.9 % (FLUSH) 0.9 %
5-40 SYRINGE (ML) INJECTION AS NEEDED
Status: DISCONTINUED | OUTPATIENT
Start: 2019-09-19 | End: 2019-09-20 | Stop reason: HOSPADM

## 2019-09-19 RX ORDER — PHENYLEPHRINE HCL IN 0.9% NACL 0.4MG/10ML
SYRINGE (ML) INTRAVENOUS AS NEEDED
Status: DISCONTINUED | OUTPATIENT
Start: 2019-09-19 | End: 2019-09-19 | Stop reason: HOSPADM

## 2019-09-19 RX ORDER — FENTANYL CITRATE 50 UG/ML
INJECTION, SOLUTION INTRAMUSCULAR; INTRAVENOUS AS NEEDED
Status: DISCONTINUED | OUTPATIENT
Start: 2019-09-19 | End: 2019-09-19 | Stop reason: HOSPADM

## 2019-09-19 RX ORDER — ONDANSETRON 2 MG/ML
INJECTION INTRAMUSCULAR; INTRAVENOUS AS NEEDED
Status: DISCONTINUED | OUTPATIENT
Start: 2019-09-19 | End: 2019-09-19 | Stop reason: HOSPADM

## 2019-09-19 RX ORDER — MECLIZINE HYDROCHLORIDE 25 MG/1
25 TABLET ORAL EVERY 6 HOURS
Status: DISCONTINUED | OUTPATIENT
Start: 2019-09-19 | End: 2019-09-20 | Stop reason: HOSPADM

## 2019-09-19 RX ORDER — LEVOFLOXACIN 500 MG/1
500 TABLET, FILM COATED ORAL DAILY
Status: DISCONTINUED | OUTPATIENT
Start: 2019-09-20 | End: 2019-09-20 | Stop reason: HOSPADM

## 2019-09-19 RX ORDER — ALBUTEROL SULFATE 90 UG/1
1 AEROSOL, METERED RESPIRATORY (INHALATION)
Status: DISCONTINUED | OUTPATIENT
Start: 2019-09-19 | End: 2019-09-20 | Stop reason: HOSPADM

## 2019-09-19 RX ORDER — SODIUM CHLORIDE 0.9 % (FLUSH) 0.9 %
5-40 SYRINGE (ML) INJECTION EVERY 8 HOURS
Status: DISCONTINUED | OUTPATIENT
Start: 2019-09-19 | End: 2019-09-20 | Stop reason: HOSPADM

## 2019-09-19 RX ORDER — SODIUM CHLORIDE 0.9 % (FLUSH) 0.9 %
5-40 SYRINGE (ML) INJECTION EVERY 8 HOURS
Status: DISCONTINUED | OUTPATIENT
Start: 2019-09-19 | End: 2019-09-19 | Stop reason: HOSPADM

## 2019-09-19 RX ORDER — PROPOFOL 10 MG/ML
INJECTION, EMULSION INTRAVENOUS AS NEEDED
Status: DISCONTINUED | OUTPATIENT
Start: 2019-09-19 | End: 2019-09-19 | Stop reason: HOSPADM

## 2019-09-19 RX ORDER — DOCUSATE SODIUM 100 MG/1
100 CAPSULE, LIQUID FILLED ORAL 2 TIMES DAILY
Status: DISCONTINUED | OUTPATIENT
Start: 2019-09-19 | End: 2019-09-20 | Stop reason: HOSPADM

## 2019-09-19 RX ORDER — HYDROMORPHONE HYDROCHLORIDE 1 MG/ML
.2-.5 INJECTION, SOLUTION INTRAMUSCULAR; INTRAVENOUS; SUBCUTANEOUS
Status: DISCONTINUED | OUTPATIENT
Start: 2019-09-19 | End: 2019-09-19 | Stop reason: HOSPADM

## 2019-09-19 RX ORDER — MORPHINE SULFATE 10 MG/ML
2 INJECTION, SOLUTION INTRAMUSCULAR; INTRAVENOUS
Status: DISCONTINUED | OUTPATIENT
Start: 2019-09-19 | End: 2019-09-19 | Stop reason: HOSPADM

## 2019-09-19 RX ORDER — EPHEDRINE SULFATE/0.9% NACL/PF 50 MG/5 ML
SYRINGE (ML) INTRAVENOUS AS NEEDED
Status: DISCONTINUED | OUTPATIENT
Start: 2019-09-19 | End: 2019-09-19 | Stop reason: HOSPADM

## 2019-09-19 RX ORDER — CEFAZOLIN SODIUM/WATER 2 G/20 ML
2 SYRINGE (ML) INTRAVENOUS ONCE
Status: COMPLETED | OUTPATIENT
Start: 2019-09-19 | End: 2019-09-19

## 2019-09-19 RX ORDER — BICALUTAMIDE 50 MG/1
50 TABLET, FILM COATED ORAL DAILY
Status: DISCONTINUED | OUTPATIENT
Start: 2019-09-20 | End: 2019-09-20 | Stop reason: HOSPADM

## 2019-09-19 RX ORDER — LIDOCAINE HYDROCHLORIDE 20 MG/ML
INJECTION, SOLUTION EPIDURAL; INFILTRATION; INTRACAUDAL; PERINEURAL AS NEEDED
Status: DISCONTINUED | OUTPATIENT
Start: 2019-09-19 | End: 2019-09-19 | Stop reason: HOSPADM

## 2019-09-19 RX ORDER — OXYCODONE HYDROCHLORIDE 5 MG/1
5 TABLET ORAL
Status: DISCONTINUED | OUTPATIENT
Start: 2019-09-19 | End: 2019-09-20 | Stop reason: HOSPADM

## 2019-09-19 RX ORDER — FENTANYL CITRATE 50 UG/ML
25 INJECTION, SOLUTION INTRAMUSCULAR; INTRAVENOUS
Status: DISCONTINUED | OUTPATIENT
Start: 2019-09-19 | End: 2019-09-19 | Stop reason: HOSPADM

## 2019-09-19 RX ORDER — ONDANSETRON 4 MG/1
4 TABLET, ORALLY DISINTEGRATING ORAL
Status: DISCONTINUED | OUTPATIENT
Start: 2019-09-19 | End: 2019-09-20 | Stop reason: HOSPADM

## 2019-09-19 RX ORDER — ACETAMINOPHEN 325 MG/1
650 TABLET ORAL
Status: DISCONTINUED | OUTPATIENT
Start: 2019-09-19 | End: 2019-09-20 | Stop reason: HOSPADM

## 2019-09-19 RX ORDER — DIPHENHYDRAMINE HYDROCHLORIDE 50 MG/ML
12.5 INJECTION, SOLUTION INTRAMUSCULAR; INTRAVENOUS AS NEEDED
Status: DISCONTINUED | OUTPATIENT
Start: 2019-09-19 | End: 2019-09-19 | Stop reason: HOSPADM

## 2019-09-19 RX ADMIN — MECLIZINE HYDROCHLORIDE 25 MG: 25 TABLET ORAL at 13:49

## 2019-09-19 RX ADMIN — MECLIZINE HYDROCHLORIDE 25 MG: 25 TABLET ORAL at 17:59

## 2019-09-19 RX ADMIN — OXYCODONE HYDROCHLORIDE 5 MG: 5 TABLET ORAL at 21:36

## 2019-09-19 RX ADMIN — FENTANYL CITRATE 25 MCG: 50 INJECTION, SOLUTION INTRAMUSCULAR; INTRAVENOUS at 10:46

## 2019-09-19 RX ADMIN — GENTAMICIN SULFATE 300 MG: 40 INJECTION, SOLUTION INTRAMUSCULAR; INTRAVENOUS at 10:30

## 2019-09-19 RX ADMIN — DOCUSATE SODIUM 100 MG: 100 CAPSULE, LIQUID FILLED ORAL at 18:00

## 2019-09-19 RX ADMIN — PROPOFOL 150 MG: 10 INJECTION, EMULSION INTRAVENOUS at 10:18

## 2019-09-19 RX ADMIN — Medication 10 ML: at 21:36

## 2019-09-19 RX ADMIN — FENTANYL CITRATE 50 MCG: 50 INJECTION, SOLUTION INTRAMUSCULAR; INTRAVENOUS at 10:18

## 2019-09-19 RX ADMIN — DOCUSATE SODIUM 100 MG: 100 CAPSULE, LIQUID FILLED ORAL at 13:49

## 2019-09-19 RX ADMIN — ONDANSETRON HYDROCHLORIDE 4 MG: 2 INJECTION, SOLUTION INTRAMUSCULAR; INTRAVENOUS at 10:59

## 2019-09-19 RX ADMIN — Medication 10 ML: at 13:49

## 2019-09-19 RX ADMIN — MECLIZINE HYDROCHLORIDE 25 MG: 25 TABLET ORAL at 23:43

## 2019-09-19 RX ADMIN — Medication 120 MCG: at 10:23

## 2019-09-19 RX ADMIN — Medication 10 MG: at 10:35

## 2019-09-19 RX ADMIN — LIDOCAINE HYDROCHLORIDE 60 MG: 20 INJECTION, SOLUTION EPIDURAL; INFILTRATION; INTRACAUDAL; PERINEURAL at 10:18

## 2019-09-19 RX ADMIN — SODIUM CHLORIDE, SODIUM LACTATE, POTASSIUM CHLORIDE, AND CALCIUM CHLORIDE 25 ML/HR: 600; 310; 30; 20 INJECTION, SOLUTION INTRAVENOUS at 09:32

## 2019-09-19 RX ADMIN — FENTANYL CITRATE 25 MCG: 50 INJECTION, SOLUTION INTRAMUSCULAR; INTRAVENOUS at 10:48

## 2019-09-19 RX ADMIN — Medication 2 G: at 10:30

## 2019-09-19 NOTE — PROGRESS NOTES
CM noted observation status and provided pt with both Medicare Outpatient Observation Notice and Pt Guide to Observation and Outpatient Care. CM explained both documents and urged pt to reach out to his insurer if he had additional questions. Pt verbalized understanding and signed documents; CM placed in chart.     AMADEO Salomon  Care Manager, [address] [place] [address]

## 2019-09-19 NOTE — PERIOP NOTES
Handoff Report from Operating Room to PACU    Report received from [address] [place] [address] Vanessa regarding Mirna Ratliff. .      Surgeon(s):  Mya Ray MD  And Procedure(s) (LRB):  BIPOLAR TRANSURETHRAL RESECTION OF PROSTATE (N/A)  confirmed   with allergies and dressings discussed. Anesthesia type, drugs, patient history, complications, estimated blood loss, vital signs, intake and output, and last pain medication, lines and temperature were reviewed. 1215: TRANSFER - OUT REPORT:    Verbal report given to [facility] (name) on Mirna Ratliff.  being transferred to Gen Surg (unit) for routine post - op       Report consisted of patients Situation, Background, Assessment and   Recommendations(SBAR). Information from the following report(s) SBAR, Kardex, Intake/Output, MAR and Med Rec Status was reviewed with the receiving nurse. Lines:   Peripheral IV 09/19/19 Right Forearm (Active)   Site Assessment Clean, dry, & intact 9/19/2019 12:51 PM   Phlebitis Assessment 0 9/19/2019 12:51 PM   Infiltration Assessment 0 9/19/2019 12:51 PM   Dressing Status Clean, dry, & intact 9/19/2019 12:51 PM   Dressing Type Transparent;Tape 9/19/2019 12:51 PM   Hub Color/Line Status Green;Capped 9/19/2019 12:51 PM        Opportunity for questions and clarification was provided.       Patient transported with:   O2 @ 2 liters  Tech     Gave  volunteer patients room number to give to patients family

## 2019-09-19 NOTE — ANESTHESIA POSTPROCEDURE EVALUATION
Procedure(s):  BIPOLAR TRANSURETHRAL RESECTION OF PROSTATE.    general    Anesthesia Post Evaluation        Patient location during evaluation: PACU  Note status: Adequate. Level of consciousness: responsive to verbal stimuli and sleepy but conscious  Pain management: satisfactory to patient  Airway patency: patent  Anesthetic complications: no  Cardiovascular status: acceptable  Respiratory status: acceptable  Hydration status: acceptable  Comments: +Post-Anesthesia Evaluation and Assessment    Patient: Yohana Rudolph MRN: [id number]  SSN: [id number]   YOB: 1931  Age: 80 y.o. Sex: male      Cardiovascular Function/Vital Signs    /61 (BP 1 Location: Left arm, BP Patient Position: At rest)   Pulse 76   Temp 36.4 °C (97.6 °F)   Resp 13   Ht 5' 7.5\" (1.715 m)   Wt 66.7 kg (147 lb 0.8 oz)   SpO2 100%   BMI 22.69 kg/m²     Patient is status post Procedure(s):  BIPOLAR TRANSURETHRAL RESECTION OF PROSTATE. Nausea/Vomiting: Controlled. Postoperative hydration reviewed and adequate. Pain:  Pain Scale 1: Numeric (0 - 10) (09/19/19 1200)  Pain Intensity 1: 0 (09/19/19 1200)   Managed. Neurological Status:   Neuro (WDL): Exceptions to WDL (09/19/19 1135)   At baseline. Mental Status and Level of Consciousness: Arousable. Pulmonary Status:   O2 Device: Nasal cannula (09/19/19 1200)   Adequate oxygenation and airway patent. Complications related to anesthesia: None    Post-anesthesia assessment completed. No concerns. Signed By: Kesha Coles MD    9/19/2019  Post anesthesia nausea and vomiting:  controlled      Vitals Value Taken Time   /61 9/19/2019 12:00 PM   Temp 36.4 °C (97.6 °F) 9/19/2019 11:36 AM   Pulse 78 9/19/2019 12:05 PM   Resp 16 9/19/2019 12:05 PM   SpO2 99 % 9/19/2019 12:05 PM   Vitals shown include unvalidated device data.

## 2019-09-19 NOTE — BRIEF OP NOTE
BRIEF OPERATIVE NOTE    Date of Procedure: 9/19/2019   Preoperative Diagnosis: ACP, URINARY RETENTION  Postoperative Diagnosis: SAME  Procedure(s):  BIPOLAR TRANSURETHRAL RESECTION OF PROSTATE  Surgeon(s) and Role:     * Fernie Heard MD - Primary           Surgical Staff:  Circ-1: Miguel Deshpande  Scrub Tech-1: Chau Ulloa  Event Time In Time Out   Incision Start 1039    Incision Close 1122      Anesthesia: General   Estimated Blood Loss: MINIMAL  Specimens:   ID Type Source Tests Collected by Time Destination   1 : prostate chips Preservative   Fernie Heard MD 9/19/2019 1107 Pathology      Findings: Obstructing prostate, catheter encrustations  Complications: none  Implants: 24 Fr 3 way 30 cc Conrad

## 2019-09-19 NOTE — PROGRESS NOTES
Problem: Falls - Risk of  Goal: *Absence of Falls  Description  Document Travis Boeck Fall Risk and appropriate interventions in the flowsheet.   Outcome: Progressing Towards Goal  Note:   Fall Risk Interventions:            Medication Interventions: Patient to call before getting OOB    Elimination Interventions: Call light in reach

## 2019-09-19 NOTE — PROGRESS NOTES
General Surgery End of Shift Nursing Note    Bedside shift change report given to [address] Mendez (oncoming nurse) by Feliz Mckeon (offgoing nurse). Report included the following information SBAR, Kardex, OR Summary, Intake/Output, MAR and Recent Results. Shift worked:   7a-7p    Summary of shift:   Patient arrived to the unit around 1. No complaints of pain or nausea. Tolerating diet. CBI light pink to clear.     Issues for physician to address:  None      Number times ambulated in hallway past shift: 0    Number of times OOB to chair past shift: 0    Pain Management:  Current medication: tramadol- not given this shift   Patient states pain is manageable on current pain medication: YES    GI:    Current diet:  DIET REGULAR    Tolerating current diet: YES  Last Bowel Movement: several days ago           Jese Valdes

## 2019-09-19 NOTE — ANESTHESIA PREPROCEDURE EVALUATION
Relevant Problems   No relevant active problems       Anesthetic History     PONV          Review of Systems / Medical History  Patient summary reviewed, nursing notes reviewed and pertinent labs reviewed    Pulmonary    COPD (not on home O2, feels fine today): moderate        Asthma        Neuro/Psych   Within defined limits           Cardiovascular    Hypertension          CAD    Exercise tolerance: <4 METS  Comments: EF 50% mod CAD    40% right ICA origin stenosis.    GI/Hepatic/Renal  Within defined limits              Endo/Other        Arthritis and cancer (prostate)     Other Findings            Physical Exam    Airway  Mallampati: III  TM Distance: 4 - 6 cm  Neck ROM: normal range of motion   Mouth opening: Normal     Cardiovascular  Regular rate and rhythm,  S1 and S2 normal,  no murmur, click, rub, or gallop             Dental    Dentition: Lower partial plate     Pulmonary  Breath sounds clear to auscultation               Abdominal  GI exam deferred       Other Findings            Anesthetic Plan    ASA: 3  Anesthesia type: general            Anesthetic plan and risks discussed with: Patient

## 2019-09-19 NOTE — PROGRESS NOTES
Looks/feels good. Urine min blood and few small clots on slow CBI. PLAN:  Wean CBI.   VT in AM then DC home

## 2019-09-20 VITALS
TEMPERATURE: 98.4 F | WEIGHT: 147.05 LBS | OXYGEN SATURATION: 98 % | HEART RATE: 76 BPM | BODY MASS INDEX: 22.29 KG/M2 | HEIGHT: 68 IN | RESPIRATION RATE: 17 BRPM | DIASTOLIC BLOOD PRESSURE: 73 MMHG | SYSTOLIC BLOOD PRESSURE: 123 MMHG

## 2019-09-20 PROCEDURE — 99218 HC RM OBSERVATION: CPT

## 2019-09-20 PROCEDURE — 94760 N-INVAS EAR/PLS OXIMETRY 1: CPT

## 2019-09-20 PROCEDURE — 51798 US URINE CAPACITY MEASURE: CPT

## 2019-09-20 PROCEDURE — 74011250637 HC RX REV CODE- 250/637: Performed by: UROLOGY

## 2019-09-20 PROCEDURE — 74011250636 HC RX REV CODE- 250/636: Performed by: UROLOGY

## 2019-09-20 RX ORDER — LEVOFLOXACIN 250 MG/1
250 TABLET ORAL DAILY
Qty: 3 TAB | Refills: 0 | Status: SHIPPED | OUTPATIENT
Start: 2019-09-20 | End: 2019-09-23

## 2019-09-20 RX ORDER — LIDOCAINE HCL/PF 100 MG/5ML
SYRINGE (ML) INTRAVENOUS
Status: DISCONTINUED
Start: 2019-09-20 | End: 2019-09-20 | Stop reason: HOSPADM

## 2019-09-20 RX ADMIN — ISOSORBIDE MONONITRATE 30 MG: 30 TABLET, EXTENDED RELEASE ORAL at 09:17

## 2019-09-20 RX ADMIN — DOCUSATE SODIUM 100 MG: 100 CAPSULE, LIQUID FILLED ORAL at 09:17

## 2019-09-20 RX ADMIN — MECLIZINE HYDROCHLORIDE 25 MG: 25 TABLET ORAL at 11:44

## 2019-09-20 RX ADMIN — BICALUTAMIDE 50 MG: 50 TABLET, FILM COATED ORAL at 09:21

## 2019-09-20 RX ADMIN — Medication 10 ML: at 05:35

## 2019-09-20 RX ADMIN — MECLIZINE HYDROCHLORIDE 25 MG: 25 TABLET ORAL at 05:35

## 2019-09-20 RX ADMIN — PANTOPRAZOLE SODIUM 40 MG: 40 TABLET, DELAYED RELEASE ORAL at 09:17

## 2019-09-20 RX ADMIN — LEVOFLOXACIN 500 MG: 500 TABLET, FILM COATED ORAL at 09:17

## 2019-09-20 NOTE — PROGRESS NOTES
Pt initially voided after cruz removal 6 hours ago - now with c/o discomfort and inability to void despite multiple attempts. Bladder scan = 668ml. Page out to [facility]. [address] returned call, will discuss above with Dr. Og Cruz and call back. 1211 Order for cruz insertion written - placed without difficulty. 550cc luz urine emptied at this time, pt verbalizes relief.

## 2019-09-20 NOTE — PROGRESS NOTES
Progress Note    Patient: Roxie Barnard. MRN: [id number]  SSN: [id number]    YOB: 1931  Age: 80 y.o. Sex: male        ADMITTED:  2019 to Ciera Reina MD  for Prostate cancer [facility]) Chaz Catching  Retention of urine [R33.9]         Kali Fields. is 1 Day Post-Op Procedure(s):  BIPOLAR TRANSURETHRAL RESECTION OF PROSTATE. He is doing well. He voided 250 mLs after VT this morning, now 6 hours later, not able to void. BS 668mLs. 16 fr coude' catheter placed w/ 550mLs out. Vitals:  Temp (24hrs), Av °F (36.7 °C), Min:97.6 °F (36.4 °C), Max:98.4 °F (36.9 °C)     Blood pressure 123/73, pulse 76, temperature 98.4 °F (36.9 °C), resp. rate 17, height 5' 7.5\" (1.715 m), weight 66.7 kg (147 lb 0.8 oz), SpO2 98 %. I&O's:   1901 -  0700  In: 26189 [P.O.:800; I.V.:920]  Out: 76091 [CZXSE:22560]    0701 -  1900  In: -   Out: 550 [Urine:550]     Exam:     Appearance: Well-developed no acute distress  Conjunctiva: Conjunctiva and lids normal  External ears/nose: Normal no lesions or deformities  Hearing: Grossly intact  Neck: Supple without masses  Respiratory effort: Breathing easily  Lower extremity: No edema  Abdomen/flank: Soft, nontender, without masses, no CVA tenderness  Digits/nails: No clubbing cyanosis or petechiae  Skin inspection: Warm and dry  Sensation: No sensory deficits  Judgment/Insight: intact  Mood/Affect: normal     Labs:   No results for input(s): WBC, HGB, HCT, PLT, HGBEXT, HCTEXT, PLTEXT in the last 72 hours. No results for input(s): NA, K, CL, CO2, GLU, BUN, CREA, CA in the last 72 hours.        Assessment:     - 1 Day Post-Op Procedure(s):  BIPOLAR TRANSURETHRAL RESECTION OF PROSTATE    Active Problems:    Prostate cancer ([facility] [address] (2019)      Retention of urine (2019)        Plan:     -DC home w/ cruz catheter.  -Cruz education.  -Will need to return to office Monday or Tuesday for VT.  -TURP f/u on 10/7/2019 @ 10:40 @ [address] [facility] office    Signed By: Emily Last NP - September 20, 2019

## 2019-09-20 NOTE — OP NOTES
[address]  OPERATIVE REPORT    Name:  Su Alanis  MR#:  [id number]  :  1931  ACCOUNT #:  [de-identified]  DATE OF SERVICE:  2019    PREOPERATIVE DIAGNOSIS:  Prostate cancer, urinary retention. POSTOPERATIVE DIAGNOSIS:  Prostate cancer, urinary retention. PROCEDURE PERFORMED:  TURP. SURGEON:  Mandi Chadwick MD    ASSISTANT:  None. ANESTHESIA:  General.    COMPLICATIONS:  None. SPECIMENS REMOVED:  Prostate chips. IMPLANTS:  24-Tajik, 30 mL 3-way Conrad catheter. ESTIMATED BLOOD LOSS:  minimal    INDICATIONS:  An 59-year-old white male with local invasive high-grade prostate cancer on androgen blockade and urinary retention with Conrad catheter in place and recent UTI treated with Levaquin. PROCEDURE IN DETAIL:  He underwent a general anesthetic. He was placed in the dorsal lithotomy position. His catheter was taken out. He was prepped and draped in a sterile fashion. A time-out was called confirming the planned procedure. A 21-Tajik cystoscope with 30-degree lens and video camera was used. Anterior urethra was normal.  Posterior urethra was obstructing mainly lateral lobe fashion. The bladder had some catheter cystitis and then crustations presumably from being on the Conrad catheter balloon. An [employer] evacuator was used to remove all of these. A #24 Tajik resectoscope sheath with the obturator was inserted. The bipolar TURP resectoscope was used and standard TURP was performed in the usual fashion with some bleeding evidence of approaching the capsule on the left side and less obstructing tissue than I would have guessed. The cautery was used to achieve excellent hemostasis. The Lucky [employer] evacuator was used to evacuate the prostate chips and these were sent for permanent pathologic examination. The official result was excellent.   The scope was removed and a 24-Tajik 3-way 30 mL Conrad catheter balloon was inserted with the catheter guide and connected, and the efflux noted to be minimally bloody. The catheter was attached to continuous irrigation, completing the procedure. Operative findings were discussed with his wife. He was stable on my departure from the operative room.       MD HUMPHREY Valerio/[id number]  D:  09/19/2019 11:47  T:  09/19/2019 12:49  JOB #:  [id number]

## 2019-09-20 NOTE — PROGRESS NOTES
Initiated voiding trial. Bladder fill with 300 ml and patient voided 250 ml pinkish urine 15 minutes after. No blood clots noted. Bladder scanned post void resulted 25 ml residual. Denies bladder spasm.

## 2020-01-17 ENCOUNTER — HOSPITAL ENCOUNTER (EMERGENCY)
Age: 85
Discharge: HOME | End: 2020-01-17
Attending: STUDENT IN AN ORGANIZED HEALTH CARE EDUCATION/TRAINING PROGRAM
Payer: MEDICARE

## 2020-01-17 VITALS
DIASTOLIC BLOOD PRESSURE: 69 MMHG | BODY MASS INDEX: 21.91 KG/M2 | SYSTOLIC BLOOD PRESSURE: 159 MMHG | TEMPERATURE: 97.8 F | OXYGEN SATURATION: 96 % | RESPIRATION RATE: 16 BRPM | WEIGHT: 141.98 LBS | HEART RATE: 88 BPM

## 2020-01-17 DIAGNOSIS — T83.9XXA FOLEY CATHETER PROBLEM, INITIAL ENCOUNTER (HCC): Primary | ICD-10-CM

## 2020-01-17 PROCEDURE — 77030040830 HC CATH URETH FOL MDII -A

## 2020-01-17 PROCEDURE — 99283 EMERGENCY DEPT VISIT LOW MDM: CPT

## 2020-01-18 NOTE — ED PROVIDER NOTES
Urinary Catheter Problem    This is a new problem. The current episode started 3 to 5 hours ago. The problem has been gradually worsening. The quality of the pain is described as burning. The pain is mild. There has been no fever. Associated symptoms include abdominal pain (suprapubic). Pertinent negatives include no nausea, no vomiting, no hematuria, no flank pain and no back pain. He has tried nothing for the symptoms. The treatment provided no relief. His past medical history is significant for urinary catheter problem. Past medical history comments: prostate cancer.         Past Medical History:   Diagnosis Date    Arthritis     Asthma     CAD (coronary artery disease)     Cancer ([facility] [address]     Prostate    Chronic obstructive pulmonary disease (HCC)     Chronic pain     Essential hypertension     Pt denies    Hyperlipidemia        Past Surgical History:   Procedure Laterality Date    HX CORONARY ARTERY BYPASS GRAFT      CABG x 5    HX GI      Hernia Surgery x 3    HX HEART CATHETERIZATION      HX HEENT      Bilateral Cataract surgery    HX HEENT      Tonsils and Adenoids    HX ORTHOPAEDIC      Bilateral Hip replacement         Family History:   Problem Relation Age of Onset    Heart Disease Mother     Cancer Father        Social History     Socioeconomic History    Marital status:      Spouse name: Not on file    Number of children: Not on file    Years of education: Not on file    Highest education level: Not on file   Occupational History    Not on file   Social Needs    Financial resource strain: Not on file    Food insecurity:     Worry: Not on file     Inability: Not on file    Transportation needs:     Medical: Not on file     Non-medical: Not on file   Tobacco Use    Smoking status: Never Smoker    Smokeless tobacco: Never Used   Substance and Sexual Activity    Alcohol use: Not Currently     Alcohol/week: 0.0 standard drinks     Comment: occasional drink at night 2-3 times per week    Drug use: No    Sexual activity: Not on file   Lifestyle    Physical activity:     Days per week: Not on file     Minutes per session: Not on file    Stress: Not on file   Relationships    Social connections:     Talks on phone: Not on file     Gets together: Not on file     Attends Jew service: Not on file     Active member of club or organization: Not on file     Attends meetings of clubs or organizations: Not on file     Relationship status: Not on file    Intimate partner violence:     Fear of current or ex partner: Not on file     Emotionally abused: Not on file     Physically abused: Not on file     Forced sexual activity: Not on file   Other Topics Concern    Not on file   Social History Narrative    Not on file         ALLERGIES: Lipitor [atorvastatin] and Sulfa (sulfonamide antibiotics)    Review of Systems   Gastrointestinal: Positive for abdominal pain (suprapubic). Negative for nausea and vomiting. Genitourinary: Positive for difficulty urinating and penile pain. Negative for flank pain and hematuria. Musculoskeletal: Negative for back pain. Vitals:    01/17/20 2257   Pulse: 88   Resp: 16   SpO2: 97%   Weight: 64.4 kg (141 lb 15.6 oz)            Physical Exam  Constitutional:       General: He is not in acute distress. Appearance: He is not ill-appearing. Abdominal:      Tenderness: There is tenderness (mild) in the suprapubic area. There is no right CVA tenderness or left CVA tenderness. Genitourinary:     Comments: +indwelling Conrad catheter, no UOP in leg bag, no gross hematuria  Neurological:      Mental Status: He is alert and oriented to person, place, and time. MDM       Procedures      Will replace Conrad and have pt follow up with [facility].

## 2020-01-18 NOTE — ED TRIAGE NOTES
Triage Note: Patient arrives to ER complaining of cruz cath problem. Pt states at 2000 he noticed that his cruz was not draining, states he has been having some burning for a few days. Wife states he is due to have it changed 1/22/20 at [facility].

## 2020-02-26 ENCOUNTER — HOSPITAL ENCOUNTER (EMERGENCY)
Age: 85
Discharge: HOME | End: 2020-02-26
Attending: EMERGENCY MEDICINE
Payer: MEDICARE

## 2020-02-26 VITALS
DIASTOLIC BLOOD PRESSURE: 94 MMHG | RESPIRATION RATE: 18 BRPM | HEART RATE: 79 BPM | WEIGHT: 144.62 LBS | BODY MASS INDEX: 22.32 KG/M2 | SYSTOLIC BLOOD PRESSURE: 147 MMHG | OXYGEN SATURATION: 97 % | TEMPERATURE: 98.6 F

## 2020-02-26 DIAGNOSIS — N13.9 URINARY OBSTRUCTION: Primary | ICD-10-CM

## 2020-02-26 LAB
APPEARANCE UR: CLEAR
BACTERIA URNS QL MICRO: ABNORMAL /HPF
BILIRUB UR QL: NEGATIVE
COLOR UR: ABNORMAL
EPITH CASTS URNS QL MICRO: ABNORMAL /LPF
GLUCOSE UR STRIP.AUTO-MCNC: NEGATIVE MG/DL
HGB UR QL STRIP: ABNORMAL
KETONES UR QL STRIP.AUTO: NEGATIVE MG/DL
LEUKOCYTE ESTERASE UR QL STRIP.AUTO: ABNORMAL
NITRITE UR QL STRIP.AUTO: NEGATIVE
PH UR STRIP: 6.5 [PH] (ref 5–8)
PROT UR STRIP-MCNC: 30 MG/DL
RBC #/AREA URNS HPF: ABNORMAL /HPF (ref 0–5)
SP GR UR REFRACTOMETRY: 1.02 (ref 1–1.03)
UR CULT HOLD, URHOLD: NORMAL
UROBILINOGEN UR QL STRIP.AUTO: 0.2 EU/DL (ref 0.2–1)
WBC URNS QL MICRO: >100 /HPF (ref 0–4)

## 2020-02-26 PROCEDURE — 77030034696 HC CATH URETH FOL 2W BARD -A

## 2020-02-26 PROCEDURE — 87077 CULTURE AEROBIC IDENTIFY: CPT

## 2020-02-26 PROCEDURE — 87086 URINE CULTURE/COLONY COUNT: CPT

## 2020-02-26 PROCEDURE — 51702 INSERT TEMP BLADDER CATH: CPT

## 2020-02-26 PROCEDURE — 99284 EMERGENCY DEPT VISIT MOD MDM: CPT

## 2020-02-26 PROCEDURE — 81001 URINALYSIS AUTO W/SCOPE: CPT

## 2020-02-26 PROCEDURE — 87186 SC STD MICRODIL/AGAR DIL: CPT

## 2020-02-26 NOTE — DISCHARGE INSTRUCTIONS
Patient Education        Learning About How to Care for a Person's Indwelling Urinary Catheter  Introduction    A urinary catheter is a flexible plastic tube used to drain urine from the bladder when a person cannot urinate. A doctor will place the catheter into the bladder by inserting it through the urethra. The urethra is the opening that carries urine from the bladder to the outside of the body. When the catheter is in the bladder, a small balloon is used to keep the catheter in place. The catheter lets urine drain from the bladder into a bag. The bag is usually attached to the thigh. Urinary catheters can be used in both men and women. A catheter that stays in place for a longer period of time is called an indwelling catheter. A catheter may be needed because of certain medical conditions. These include an enlarged prostate or problems controlling urine. It may be used after surgery on the pelvis or urinary tract. Urinary catheters are also used when the lower part of the body is paralyzed. When helping a loved one with a catheter, try to be relaxed. Caring for a catheter can be embarrassing for both of you. This may be especially true if you are caring for someone of the opposite sex. If you are calm and don't seem embarrassed, the person may feel more comfortable. How do you take care of the catheter? Wear disposable gloves when handling someone's catheter. Make sure to follow all of the instructions the doctor has given. And always wash your hands before and after you're done. Here are some other things to remember when caring for someone's catheter:  · Make sure that urine is running out of the catheter into the urine collection bag. And make sure that the catheter tubing does not get twisted or bent. · Keep the urine collection bag below the level of the bladder. At night it may be helpful to hang the bag on the side of the bed.   · Make sure that the urine collection bag does not drag and pull on the catheter. · It is okay to shower with a catheter and urine collection bag in place, unless the doctor says not to. · Check for swelling or signs of infection in the area around the catheter. Signs of infection include pus or irritated, swollen, red, or tender skin. · Clean the area around the catheter twice a day with soap and water. Dry with a clean towel afterward. · Do not apply powder or lotion to the skin around the catheter. · Do not tug or pull on the catheter. · Sexual intercourse may still be possible for individuals who wear a catheter. It is best to talk with a doctor about options. How do you empty the bag? The urine collection bag needs to be emptied regularly. It is best to empty the bag when it's about half full or at bedtime. If the doctor has asked you to measure the amount of urine, do that before you empty the urine into the toilet. When you are ready to empty the bag, follow these steps:  1. Put on disposable gloves. 2. Remove the drain spout from its sleeve at the bottom of the collection bag. Open the valve on the spout. 3. Let the urine flow out of the bag and into the toilet or a container. Do not let the tubing or drain spout touch anything. 4. After you empty the bag, close the valve and put the drain spout back into its sleeve. 5. Remove your gloves and throw them away. 6. Wash your hands with soap and water. How do you care for someone after the catheter is removed? After the catheter is taken out, the person may have trouble urinating. If this happens, try helping them sit in a few inches of warm water (sitz bath). If the urge to urinate comes during the sitz bath, it may be easier for them to urinate while still in the bath. Some burning may happen the first few times the person urinates. If the burning lasts longer, it may be a sign of an infection. If the catheter causes irritation or a rash, wearing loose, cotton underwear may help.   Watch closely for changes in the person's health, and be sure to contact their doctor if you notice any problems. Where can you learn more? Go to http://lu-natalia.info/. Enter V024 in the search box to learn more about \"Learning About How to Care for a Person's Indwelling Urinary Catheter. \"  Current as of: April 1, 2019  Content Version: 12.2  © 6635-1885 [employer]. Care instructions adapted under license by [employer] (which disclaims liability or warranty for this information). If you have questions about a medical condition or this instruction, always ask your healthcare professional. [place] [address] any warranty or liability for your use of this information.

## 2020-02-26 NOTE — ED NOTES
~400 mL foul smelling turbid urine obtained upon cruz replacement. Converted to leg bag. Asked patient about home use of leg bag vs. Drainage bag. Pt doesn't use drainage bag, educated on importance of use. Pt declined drainage bag, stating \"I empty the thing 3-4 times a night\". Patient discharged with vital signs stable, AVS, in no acute distress.

## 2020-02-26 NOTE — ED PROVIDER NOTES
The history is provided by the patient and the spouse. No  was used. Urinary Catheter Problem    This is a recurrent problem. The current episode started 6 to 12 hours ago. The problem occurs every urination. The problem has not changed since onset. The pain is at a severity of 5/10. The pain is moderate. There has been no fever. Associated symptoms include abdominal pain. Pertinent negatives include no chills, no sweats, no nausea, no vomiting, no discharge, no frequency, no hematuria, no hesitancy, no urgency, no flank pain, no penile discharge and no back pain. He has tried nothing for the symptoms. The treatment provided no relief. His past medical history is significant for urinary catheter problem.         Past Medical History:   Diagnosis Date    Arthritis     Asthma     CAD (coronary artery disease)     Cancer ([facility] [address]     Prostate    Chronic obstructive pulmonary disease (HCC)     Chronic pain     Essential hypertension     Pt denies    Hyperlipidemia        Past Surgical History:   Procedure Laterality Date    HX CORONARY ARTERY BYPASS GRAFT      CABG x 5    HX GI      Hernia Surgery x 3    HX HEART CATHETERIZATION      HX HEENT      Bilateral Cataract surgery    HX HEENT      Tonsils and Adenoids    HX ORTHOPAEDIC      Bilateral Hip replacement         Family History:   Problem Relation Age of Onset    Heart Disease Mother     Cancer Father        Social History     Socioeconomic History    Marital status:      Spouse name: Not on file    Number of children: Not on file    Years of education: Not on file    Highest education level: Not on file   Occupational History    Not on file   Social Needs    Financial resource strain: Not on file    Food insecurity:     Worry: Not on file     Inability: Not on file    Transportation needs:     Medical: Not on file     Non-medical: Not on file   Tobacco Use    Smoking status: Never Smoker    Smokeless tobacco: Never Used   Substance and Sexual Activity    Alcohol use: Not Currently     Alcohol/week: 0.0 standard drinks     Comment: occasional drink at night 2-3 times per week    Drug use: No    Sexual activity: Not on file   Lifestyle    Physical activity:     Days per week: Not on file     Minutes per session: Not on file    Stress: Not on file   Relationships    Social connections:     Talks on phone: Not on file     Gets together: Not on file     Attends Mandaeism service: Not on file     Active member of club or organization: Not on file     Attends meetings of clubs or organizations: Not on file     Relationship status: Not on file    Intimate partner violence:     Fear of current or ex partner: Not on file     Emotionally abused: Not on file     Physically abused: Not on file     Forced sexual activity: Not on file   Other Topics Concern    Not on file   Social History Narrative    Not on file         ALLERGIES: Lipitor [atorvastatin] and Sulfa (sulfonamide antibiotics)    Review of Systems   Constitutional: Negative for activity change, chills and fever. HENT: Negative for nosebleeds, sore throat, trouble swallowing and voice change. Eyes: Negative for visual disturbance. Respiratory: Negative for shortness of breath. Cardiovascular: Negative for chest pain and palpitations. Gastrointestinal: Positive for abdominal pain. Negative for constipation, diarrhea, nausea and vomiting. Genitourinary: Negative for difficulty urinating, dysuria, flank pain, frequency, hematuria, hesitancy, penile discharge and urgency. Musculoskeletal: Negative for back pain, neck pain and neck stiffness. Skin: Negative for color change. Allergic/Immunologic: Negative for immunocompromised state. Neurological: Negative for dizziness, seizures, syncope, weakness, light-headedness, numbness and headaches.    Psychiatric/Behavioral: Negative for behavioral problems, confusion, hallucinations, self-injury and suicidal ideas.       Vitals:    02/26/20 1714   BP: (!) 147/94   Pulse: 79   Resp: 18   Temp: 98.6 °F (37 °C)   SpO2: 97%   Weight: 65.6 kg (144 lb 10 oz)            Physical Exam  Vitals signs and nursing note reviewed. Constitutional:       General: He is not in acute distress. Appearance: He is well-developed. He is not diaphoretic. HENT:      Head: Atraumatic. Neck:      Trachea: No tracheal deviation. Cardiovascular:      Comments: Warm and well perfused  Pulmonary:      Effort: Pulmonary effort is normal. No respiratory distress. Musculoskeletal: Normal range of motion. Skin:     General: Skin is warm and dry. Neurological:      Mental Status: He is alert. Coordination: Coordination normal.   Psychiatric:         Behavior: Behavior normal.         Thought Content: Thought content normal.         Judgment: Judgment normal.          MDM     This is an 59-year-old male with past medical history, review of systems, physical exam as above, presenting with complaints of urinary outlet obstruction. Patient states chronic indwelling Conrad secondary to history of prostate cancer. He endorses there was urine in the bag this morning, which he drained, denies presence of blood. He states however since then he has been unable to produce urine. He denies fevers, chills, nausea or vomiting, or other changes in his health. Physical exam is remarkable for well-appearing cachectic elderly male, in no acute distress, with mild tenderness over the suprapubic space, empty Conrad bag. Plan to attempt Conrad change, send UA. We will reassess, and make a disposition. Procedures    5:54 PM  Patient with abnormal UA, unclear whether 2/2 infection, no other symptoms of SIRS. Will defer abx for now, send cx, patient has f/u with Urology in one week.

## 2020-02-29 LAB
BACTERIA SPEC CULT: ABNORMAL
CC UR VC: ABNORMAL
SERVICE CMNT-IMP: ABNORMAL

## 2020-02-29 NOTE — ED NOTES
Pt. With UTI. He needs to be placed on abx, likely Levofloxacin, unless he has a contraindication to levofloxacin. I called his listed home and mobile numbers, but I did not get an answer. I left a message for him to call back.       Jose Gan MD  10:07 AM

## 2020-03-05 RX ORDER — LEVOFLOXACIN 750 MG/1
750 TABLET ORAL DAILY
Qty: 5 TAB | Refills: 0 | Status: SHIPPED | OUTPATIENT
Start: 2020-03-05 | End: 2020-03-10

## 2020-06-20 ENCOUNTER — HOSPITAL ENCOUNTER (EMERGENCY)
Age: 85
Discharge: HOME | End: 2020-06-20
Attending: EMERGENCY MEDICINE
Payer: MEDICARE

## 2020-06-20 VITALS
TEMPERATURE: 98.2 F | DIASTOLIC BLOOD PRESSURE: 88 MMHG | OXYGEN SATURATION: 97 % | BODY MASS INDEX: 22.64 KG/M2 | RESPIRATION RATE: 16 BRPM | WEIGHT: 140.87 LBS | HEIGHT: 66 IN | SYSTOLIC BLOOD PRESSURE: 183 MMHG | HEART RATE: 76 BPM

## 2020-06-20 DIAGNOSIS — T83.511A URINARY TRACT INFECTION ASSOCIATED WITH INDWELLING URETHRAL CATHETER, INITIAL ENCOUNTER (HCC): Primary | ICD-10-CM

## 2020-06-20 DIAGNOSIS — N39.0 URINARY TRACT INFECTION ASSOCIATED WITH INDWELLING URETHRAL CATHETER, INITIAL ENCOUNTER (HCC): Primary | ICD-10-CM

## 2020-06-20 DIAGNOSIS — T83.091A OBSTRUCTION OF FOLEY CATHETER, INITIAL ENCOUNTER (HCC): ICD-10-CM

## 2020-06-20 LAB
APPEARANCE UR: ABNORMAL
BACTERIA URNS QL MICRO: ABNORMAL /HPF
BILIRUB UR QL: NEGATIVE
COLOR UR: ABNORMAL
EPITH CASTS URNS QL MICRO: ABNORMAL /LPF
GLUCOSE UR STRIP.AUTO-MCNC: NEGATIVE MG/DL
HGB UR QL STRIP: ABNORMAL
KETONES UR QL STRIP.AUTO: NEGATIVE MG/DL
LEUKOCYTE ESTERASE UR QL STRIP.AUTO: ABNORMAL
NITRITE UR QL STRIP.AUTO: POSITIVE
PH UR STRIP: 7 [PH] (ref 5–8)
PROT UR STRIP-MCNC: ABNORMAL MG/DL
RBC #/AREA URNS HPF: >100 /HPF (ref 0–5)
SP GR UR REFRACTOMETRY: 1.01 (ref 1–1.03)
UROBILINOGEN UR QL STRIP.AUTO: 0.2 EU/DL (ref 0.2–1)
WBC URNS QL MICRO: >100 /HPF (ref 0–4)

## 2020-06-20 PROCEDURE — 87186 SC STD MICRODIL/AGAR DIL: CPT

## 2020-06-20 PROCEDURE — 51702 INSERT TEMP BLADDER CATH: CPT

## 2020-06-20 PROCEDURE — 99283 EMERGENCY DEPT VISIT LOW MDM: CPT

## 2020-06-20 PROCEDURE — 81001 URINALYSIS AUTO W/SCOPE: CPT

## 2020-06-20 PROCEDURE — 87077 CULTURE AEROBIC IDENTIFY: CPT

## 2020-06-20 PROCEDURE — 87086 URINE CULTURE/COLONY COUNT: CPT

## 2020-06-20 PROCEDURE — 74011250637 HC RX REV CODE- 250/637: Performed by: EMERGENCY MEDICINE

## 2020-06-20 RX ORDER — LEVOFLOXACIN 750 MG/1
750 TABLET ORAL DAILY
Qty: 7 TAB | Refills: 0 | Status: SHIPPED | OUTPATIENT
Start: 2020-06-20 | End: 2020-06-27

## 2020-06-20 RX ADMIN — LEVOFLOXACIN 750 MG: 500 TABLET, FILM COATED ORAL at 07:28

## 2020-06-20 NOTE — ED TRIAGE NOTES
Pt has had urinary catheter for over a year due urinary retention due to prostate cancer. Was last changed [address]. Pt c/o burning at the catheter site and pus coming out from around the cathether since yesterday.

## 2020-06-20 NOTE — ED PROVIDER NOTES
80-year-old male with a history of prostate cancer with chronic indwelling Conrad over the last approximately 1 year, with prior ED evaluations for urinary catheter obstruction, presents to the emergency department stating that his catheter seems to be obstructed again. He states that starting yesterday evening he has been unable to have urine passage through his catheter into his leg bag like he normally does and as result he has developed burning sensation around the urethra. He states that this is very similar to previous episodes of urinary obstruction. He has noted a small amount of purulent type material around the tip of his penis and the urethra. He states that he has had prior urinary tract infections associated with his catheter. He denies any redness, swelling, testicular pain, testicular swelling. He denies any fever, chills, abdominal pain, nausea, vomiting, diarrhea, chest pain, shortness of breath, or any other medical concerns.            Past Medical History:   Diagnosis Date    Arthritis     Asthma     CAD (coronary artery disease)     Cancer ([address]     Prostate    Chronic obstructive pulmonary disease (HCC)     Chronic pain     Essential hypertension     Pt denies    Hyperlipidemia        Past Surgical History:   Procedure Laterality Date    HX CORONARY ARTERY BYPASS GRAFT      CABG x 5    HX GI      Hernia Surgery x 3    HX HEART CATHETERIZATION      HX HEENT      Bilateral Cataract surgery    HX HEENT      Tonsils and Adenoids    HX ORTHOPAEDIC      Bilateral Hip replacement         Family History:   Problem Relation Age of Onset    Heart Disease Mother     Cancer Father        Social History     Socioeconomic History    Marital status:      Spouse name: Not on file    Number of children: Not on file    Years of education: Not on file    Highest education level: Not on file   Occupational History    Not on file   Social Needs    Financial resource strain: Not on file    Food insecurity     Worry: Not on file     Inability: Not on file    Transportation needs     Medical: Not on file     Non-medical: Not on file   Tobacco Use    Smoking status: Never Smoker    Smokeless tobacco: Never Used   Substance and Sexual Activity    Alcohol use: Not Currently     Alcohol/week: 0.0 standard drinks     Comment: occasional drink at night 2-3 times per week    Drug use: No    Sexual activity: Not on file   Lifestyle    Physical activity     Days per week: Not on file     Minutes per session: Not on file    Stress: Not on file   Relationships    Social connections     Talks on phone: Not on file     Gets together: Not on file     Attends Sikhism service: Not on file     Active member of club or organization: Not on file     Attends meetings of clubs or organizations: Not on file     Relationship status: Not on file    Intimate partner violence     Fear of current or ex partner: Not on file     Emotionally abused: Not on file     Physically abused: Not on file     Forced sexual activity: Not on file   Other Topics Concern    Not on file   Social History Narrative    Not on file         ALLERGIES: Lipitor [atorvastatin] and Sulfa (sulfonamide antibiotics)    Review of Systems   Constitutional: Negative for activity change, appetite change, chills and fever. HENT: Negative for congestion, rhinorrhea, sinus pain, sneezing and sore throat. Eyes: Negative for photophobia and visual disturbance. Respiratory: Negative for cough and shortness of breath. Cardiovascular: Negative for chest pain. Gastrointestinal: Negative for abdominal pain, blood in stool, constipation, diarrhea, nausea and vomiting. Genitourinary: Positive for difficulty urinating, discharge and dysuria. Negative for flank pain, hematuria, penile pain, scrotal swelling and testicular pain. Musculoskeletal: Negative for arthralgias, back pain, myalgias and neck pain.    Skin: Negative for rash and wound.   Neurological: Negative for syncope, weakness, light-headedness, numbness and headaches. Psychiatric/Behavioral: Negative for self-injury and suicidal ideas. All other systems reviewed and are negative. Vitals:    06/20/20 0632   BP: 183/88   Pulse: 76   Resp: 16   Temp: 98.2 °F (36.8 °C)   SpO2: 97%   Weight: 63.9 kg (140 lb 14 oz)   Height: 5' 6\" (1.676 m)            Physical Exam  Vitals signs and nursing note reviewed. Constitutional:       General: He is not in acute distress. Appearance: Normal appearance. He is well-developed. He is not diaphoretic. HENT:      Head: Normocephalic and atraumatic. Nose: Nose normal.   Eyes:      Extraocular Movements: Extraocular movements intact. Conjunctiva/sclera: Conjunctivae normal.      Pupils: Pupils are equal, round, and reactive to light. Neck:      Musculoskeletal: Neck supple. Cardiovascular:      Rate and Rhythm: Normal rate and regular rhythm. Heart sounds: Normal heart sounds. Pulmonary:      Effort: Pulmonary effort is normal.      Breath sounds: Normal breath sounds. Abdominal:      General: There is no distension. Palpations: Abdomen is soft. Tenderness: There is no abdominal tenderness. Genitourinary:     Comments: Urinary catheter in place with no noted urine in the leg bag. Penis appears normal with catheter in place with no obvious purulent drainage, redness, swelling, testicles non-erythematous, no swelling, nontender. No hernia. No evidence of trauma to the area. Musculoskeletal:         General: No tenderness. Skin:     General: Skin is warm and dry. Neurological:      General: No focal deficit present. Mental Status: He is alert and oriented to person, place, and time. Cranial Nerves: No cranial nerve deficit. Sensory: No sensory deficit. Motor: No weakness.       Coordination: Coordination normal.          MDM   80-year-old male presents with urinary catheter obstruction and dysuria. Catheter was changed in the ED and UA sent. While awaiting urinalysis his care was signed out to Dr. Trace Ramirez at 7 AM.  Please see his note for further information regarding the remainder of his care while in the ED.     Procedures

## 2020-06-20 NOTE — ED NOTES
Care assumed from Dr. Danny Vega, patient with chronic indwelling Conrad secondary to prostate cancer, reported no longer draining. Conrad replaced, now draining. Chart review indicates last urine culture positive for Klebsiella, responsive to Levaquin. Will provide dosing for the same, advise follow-up with urology and primary care, return precautions given.

## 2020-06-20 NOTE — DISCHARGE INSTRUCTIONS
Patient Education        Learning About Urinary Catheter Care to Prevent Infection  What is a urinary catheter? A urinary catheter is a flexible plastic tube used to drain urine from your bladder when you can't urinate on your own. The catheter allows urine to drain from the bladder into a bag. Two types of drainage bags may be used with a urinary catheter. · A bedside bag is a large bag that you can hang on the side of your bed or on a chair. You can use it overnight or anytime you will be sitting or lying down for a long time. · A leg bag is a small bag that you can use during the day. It is usually attached to your thigh or calf and hidden under your clothes. Having a urinary catheter increases your risk of getting a urinary tract infection. Germs may get on the catheter and cause an infection in your bladder or kidneys. The longer you have a catheter, the more likely it is that you will get an infection. You can help prevent this problem with good hygiene and careful handling of your catheter and drainage bags. How can you help prevent infection? Take care to be clean   · Always wash your hands well before and after you handle your catheter. · Clean the skin around the catheter twice a day using soap and water. Dry with a clean towel afterward. You can shower with your catheter and drainage bag in place unless your doctor told you not to. · When you clean around the catheter, check the surrounding skin for signs of infection. Look for things like pus or irritated, swollen, red, or tender skin around the catheter. Be careful with your drainage bag   · Always keep the drainage bag below the level of your bladder. This will help keep urine from flowing back into your bladder. · Check often to see that urine is flowing through the catheter into the drainage bag. · Empty the drainage bag when it is half full. This will keep it from overflowing or backing up.   · When you empty the drainage bag, do not let the tubing or drain spout touch anything. · Keep the cap that comes with the tubing and cover the tip of the tubing when not in use. Be careful with your catheter   · Do not unhook the catheter from the drain tube until you are ready to change the tubing and bag. That could let germs get into the tube. · Make sure that the catheter tubing does not get twisted or kinked. · Do not tug or pull on the catheter. And make sure that the drainage bag does not drag or pull on the catheter. · Do not put powder or lotion on the skin around the catheter. · Talk with your doctor about your options for sexual intercourse while wearing a catheter. How do you empty a urine drainage bag? If your doctor has asked you to keep a record, write down the amount of urine in the bag before you empty it. Wash your hands before and after you touch the bag. 1. Remove the drain spout from its sleeve at the bottom of the drainage bag.  2. Open the valve on the drain spout. Let the urine flow out into the toilet or a container. Be careful not to let the tubing or drain spout touch anything. 3. After you empty the bag, close the valve. Then put the drain spout back into its sleeve at the bottom of the collection bag. How do you switch to a bedside bag for overnight use? Wash your hands before and after you handle the bags. 1. Empty the leg bag that is attached to the tubing and catheter. 2. Put a clean towel under the tubing attached to the leg bag.  3. Use an alcohol wipe to clean the tip of the tubing attached to the bedside bag.  4. To stop the flow of urine, pinch the catheter with your fingers just above the tubing connection. 5. Use a twisting motion to disconnect the leg bag tubing from the catheter. 6. Then securely connect the catheter to the tubing from the bedside bag. How can you clean a bedside drainage bag? Many people clean their bedside bag in the morning if they switch to a leg bag.   To clean a bedside drainage ba. Remove the bedside bag and attach the leg bag.  2. Fill the bedside bag with 2 parts vinegar and 3 parts water. Let it stand for 20 minutes. 3. Empty the bag, and let it air dry. When should you call for help? Call your doctor now or seek immediate medical care if:  · You have symptoms of a urinary infection. These may include:  ? Pain or burning when you urinate. ? A frequent need to urinate without being able to pass much urine. ? Pain in the flank, which is just below the rib cage and above the waist on either side of the back. ? Blood in your urine. ? A fever. · Your urine smells bad. · You see large blood clots in your urine. · No urine or very little urine is flowing into the bag for 4 or more hours. Watch closely for changes in your health, and be sure to contact your doctor if:  · The area around the catheter becomes irritated, swollen, red, or tender, or there is pus draining from it. · Urine is leaking from the place where the catheter enters your body. Follow-up care is a key part of your treatment and safety. Be sure to make and go to all appointments, and call your doctor if you are having problems. It's also a good idea to know your test results and keep a list of the medicines you take. Where can you learn more? Go to http://lu-natalia.info/  Enter U010 in the search box to learn more about \"Learning About Urinary Catheter Care to Prevent Infection. \"  Current as of: 2019               Content Version: 12.5  © 8280-7308 [employer]. Care instructions adapted under license by [employer] (which disclaims liability or warranty for this information). If you have questions about a medical condition or this instruction, always ask your healthcare professional. [place] [address] any warranty or liability for your use of this information.          Patient Education        Urinary Tract Infections in Men: Care Instructions  Your Care Instructions     A urinary tract infection, or UTI, is a general term for an infection anywhere between the kidneys and the tip of the penis. UTIs can also be a result of a prostate problem. Most cause pain or burning when you urinate. Most UTIs are caused by bacteria and can be cured with antibiotics. It is important to complete your treatment so that the infection does not get worse. Follow-up care is a key part of your treatment and safety. Be sure to make and go to all appointments, and call your doctor if you are having problems. It's also a good idea to know your test results and keep a list of the medicines you take. How can you care for yourself at home? · Take your antibiotics as prescribed. Do not stop taking them just because you feel better. You need to take the full course of antibiotics. · Take your medicines exactly as prescribed. Your doctor may have prescribed a medicine, such as phenazopyridine (Pyridium), to help relieve pain when you urinate. This turns your urine orange. You may stop taking it when your symptoms get better. But be sure to take all of your antibiotics, which treat the infection. · Drink extra water for the next day or two. This will help make the urine less concentrated and help wash out the bacteria causing the infection. (If you have kidney, heart, or liver disease and have to limit your fluids, talk with your doctor before you increase your fluid intake.)  · Avoid drinks that are carbonated or have caffeine. They can irritate the bladder. · Urinate often. Try to empty your bladder each time. · To relieve pain, take a hot bath or lay a heating pad (set on low) over your lower belly or genital area. Never go to sleep with a heating pad in place. To help prevent UTIs  · Drink plenty of fluids, enough so that your urine is light yellow or clear like water.  If you have kidney, heart, or liver disease and have to limit fluids, talk with your doctor before you increase the amount of fluids you drink. · Urinate when you have the urge. Do not hold your urine for a long time. Urinate before you go to sleep. · Keep your penis clean. Catheter care  If you have a drainage tube (catheter) in place, the following steps will help you care for it. · Always wash your hands before and after touching your catheter. · Check the area around the urethra for inflammation or signs of infection. Signs of infection include irritated, swollen, red, or tender skin, or pus around the catheter. · Clean the area around the catheter with soap and water two times a day. Dry with a clean towel afterward. · Do not apply powder or lotion to the skin around the catheter. To empty the urine collection bag   · Wash your hands with soap and water. · Without touching the drain spout, remove the spout from its sleeve at the bottom of the collection bag. Open the valve on the spout. · Let the urine flow out of the bag and into the toilet or a container. Do not let the tubing or drain spout touch anything. · After you empty the bag, clean the end of the drain spout with tissue and water. Close the valve and put the drain spout back into its sleeve at the bottom of the collection bag. · Wash your hands with soap and water. When should you call for help? Call your doctor now or seek immediate medical care if:  · Symptoms such as a fever, chills, nausea, or vomiting get worse or happen for the first time. · You have new pain in your back just below your rib cage. This is called flank pain. · There is new blood or pus in your urine. · You are not able to take or keep down your antibiotics. Watch closely for changes in your health, and be sure to contact your doctor if:  · You are not getting better after taking an antibiotic for 2 days. · Your symptoms go away but then come back. Where can you learn more?   Go to http://lu-natalia.info/  Enter [id number] in the search box to learn more about \"Urinary Tract Infections in Men: Care Instructions. \"  Current as of: August 22, 2019               Content Version: 12.5  © 4591-7995 [employer]. Care instructions adapted under license by [employer] (which disclaims liability or warranty for this information). If you have questions about a medical condition or this instruction, always ask your healthcare professional. [place] [address] any warranty or liability for your use of this information.

## 2020-06-22 ENCOUNTER — PATIENT OUTREACH (OUTPATIENT)
Dept: INTERNAL MEDICINE CLINIC | Age: 85
End: 2020-06-22

## 2020-06-22 LAB
BACTERIA SPEC CULT: ABNORMAL
CC UR VC: ABNORMAL
SERVICE CMNT-IMP: ABNORMAL

## 2020-06-22 NOTE — PROGRESS NOTES
Patient contacted regarding recent discharge and COVID-19 risk. Discussed COVID-19 related testing which was not done at this time. Care Transition Nurse/ Ambulatory Care Manager contacted the patient's wife Emerson Melo by telephone to perform post discharge assessment. Verified name and  with family as identifiers. Patient has following risk factors of: CAD. CTN/ACM reviewed discharge instructions, medical action plan and red flags related to discharge diagnosis. Reviewed and educated them on any new and changed medications related to discharge diagnosis. Advised obtaining a 90-day supply of all daily and as-needed medications. Education provided regarding infection prevention, and signs and symptoms of COVID-19 and when to seek medical attention with family who verbalized understanding. Discussed exposure protocols and quarantine from [address] you at higher risk for severe illness  and given an opportunity for questions and concerns. The family agrees to contact the COVID-19 hotline [phone] or PCP office for questions related to their healthcare. CTN/ACM provided contact information for future reference. From CDC: Are you at higher risk for severe illness?  Wash your hands often.  Avoid close contact (6 feet, which is about two arm lengths) with people who are sick.  Put distance between yourself and other people if COVID-19 is spreading in your community.  Clean and disinfect frequently touched surfaces.  Avoid all cruise travel and non-essential air travel.  Call your healthcare professional if you have concerns about COVID-19 and your underlying condition or if you are sick. For more information on steps you can take to protect yourself, see CDC's How to Protect Yourself      Patient/family/caregiver given information for [facility] and agrees to enroll no      Plan for follow-up call in 7-14 days based on severity of symptoms and risk factors.

## 2020-07-06 ENCOUNTER — PATIENT OUTREACH (OUTPATIENT)
Dept: INTERNAL MEDICINE CLINIC | Age: 85
End: 2020-07-06

## 2020-07-06 NOTE — PROGRESS NOTES
Patient resolved from Transition of Care episode on 7/6/2020  Discussed COVID-19 related testing which was not done at this time. Patient/family has been provided the following resources and education related to COVID-19:                         Signs, symptoms and red flags related to COVID-19            CDC exposure and quarantine guidelines            [facility] exposure contact - [phone]            Contact for their local Department of Health                 Patient currently reports that the following symptoms have improved:  no new symptoms. No further outreach scheduled with this CTN/ACM/LPN/HC/ MA. Episode of Care resolved. Patient has this CTN/ACM/LPN/HC/MA contact information if future needs arise.

## 2020-07-27 NOTE — ED NOTES
Left message for patient to call back. The patient was discharged home by Dr Marija Donnelly in stable condition. The patient is alert and oriented, in no respiratory distress and discharge vital signs obtained. The patient's diagnosis, condition and treatment were explained. The patient expressed understanding. 1 prescription given. A discharge plan has been developed. Aftercare instructions were given. Pt ambulatory out of the ED.

## 2020-09-28 ENCOUNTER — OFFICE VISIT (OUTPATIENT)
Dept: CARDIOLOGY CLINIC | Age: 85
End: 2020-09-28
Payer: MEDICARE

## 2020-09-28 VITALS
BODY MASS INDEX: 22.5 KG/M2 | WEIGHT: 140 LBS | SYSTOLIC BLOOD PRESSURE: 164 MMHG | HEIGHT: 66 IN | DIASTOLIC BLOOD PRESSURE: 68 MMHG | HEART RATE: 68 BPM | OXYGEN SATURATION: 98 %

## 2020-09-28 DIAGNOSIS — J44.9 CHRONIC OBSTRUCTIVE PULMONARY DISEASE, UNSPECIFIED COPD TYPE (HCC): ICD-10-CM

## 2020-09-28 DIAGNOSIS — E78.2 MIXED HYPERLIPIDEMIA: ICD-10-CM

## 2020-09-28 DIAGNOSIS — C61 PROSTATE CANCER (HCC): ICD-10-CM

## 2020-09-28 DIAGNOSIS — I25.10 CORONARY ARTERY DISEASE INVOLVING NATIVE CORONARY ARTERY OF NATIVE HEART WITHOUT ANGINA PECTORIS: Primary | ICD-10-CM

## 2020-09-28 PROCEDURE — G8536 NO DOC ELDER MAL SCRN: HCPCS | Performed by: INTERNAL MEDICINE

## 2020-09-28 PROCEDURE — 1101F PT FALLS ASSESS-DOCD LE1/YR: CPT | Performed by: INTERNAL MEDICINE

## 2020-09-28 PROCEDURE — 99214 OFFICE O/P EST MOD 30 MIN: CPT | Performed by: INTERNAL MEDICINE

## 2020-09-28 PROCEDURE — G8427 DOCREV CUR MEDS BY ELIG CLIN: HCPCS | Performed by: INTERNAL MEDICINE

## 2020-09-28 PROCEDURE — G8420 CALC BMI NORM PARAMETERS: HCPCS | Performed by: INTERNAL MEDICINE

## 2020-09-28 PROCEDURE — G8432 DEP SCR NOT DOC, RNG: HCPCS | Performed by: INTERNAL MEDICINE

## 2020-09-28 PROCEDURE — G0463 HOSPITAL OUTPT CLINIC VISIT: HCPCS | Performed by: INTERNAL MEDICINE

## 2020-09-28 NOTE — PROGRESS NOTES
[facility] [place]: Sarah Snowden  [phone]    HPI:  Mr. Myah Jo is a 79 yo M with CAD s/p cath on 3/19/10 with multivessel CAD, then 5 vessel CABG on 3/23/10 (LIMA to LAD, SVG to D1, OM2, OM3, and SVG to RCA), preserved EF 60% by echo 2010, hyperlipidemia (intolerance to lipitor) on Simvastatin, asthma, DJD of lumbar spine, sciatica on right side followed by orthopedics, Dr. Elma Ling; s/p surgery on his right hip on 03/27/2017. This past year, he has been dealing with stage 4 prostate CA that spread to his spine and his back. He sees Dr. Rocco Briceno for this. They talked about different therapies but he is not interested in chemo or radiation therapy. From a symptom standpoint, he has been having chest pains, sharp pain that is worse with deep breath, positional if he turns a certain way. When he is laying down at rest he does not have pain. He has baseline shortness of breath with exertion and this is unchanged. He did say he had a CT of the chest 2 weeks ago but he has not received the results of this. He does take tramadol for his back and spine; this also helps with his chest pain. He denies any palpitations, lightheadedness/dizziness. He is compensated on exam with no rales, some coarse breath sounds related to his COPD, trace LE edema. BP elevated here but has been normal at home. He is accompanied by his wife. Assessment and Plan:   1. CAD s/p CABG 2010. His cardiac cath in 2019 demonstrated patent bypass grafts. His chest pains now are non cardiac; positional, somewhat pleuritic. With his prostate CA would be concerned about possible metastasis or pulmonary involvement. He had a recent CT scan is going to follow up with his primary care. He was on imdur in the past, but he wants to hold off on restarting this until after he sees his primary care.  With regards to his both his aspirin and statin, and status of his prostate CA, I do think the benefits do not outweigh the risk and would not restart a statin or aspirin. 2. Prostate CA, stage 4. Followed by Dr. Mel Sosa. 3. Code status. He has not had discussions about this or advanced directives. Discussed this with patient and his spouse at length. He says that he has lived a good life and do not resuscitate seems most appropriate. He is will see his primary care to sort this out. 4. Dizziness. Followed by neurology and ENT. No obvious cardiac contribution  5. COPD. He  has a past medical history of Arthritis, Asthma, CAD (coronary artery disease), Cancer ([facility] [address] Chronic obstructive pulmonary disease ([facility] [address] Chronic pain, Essential hypertension, and Hyperlipidemia. Intolerance to high intensity statins is reported. Reports leg edema on prolonged dependence, dyspnea and palpitations. All other systems negative except as above. PE  Vitals:    09/28/20 1312   BP: (!) 164/68   Pulse: 68   SpO2: 98%   Weight: 140 lb (63.5 kg)   Height: 5' 6\" (1.676 m)    Body mass index is 22.6 kg/m². BP (!) 164/68   Pulse 68   Ht 5' 6\" (1.676 m)   Wt 140 lb (63.5 kg)   SpO2 98%   BMI 22.60 kg/m²   General:    Alert, cooperative, no distress. Psychiatric:    Normal Mood and affect    Eye/ENT:      Pupils equal, No asymmetry, Conjunctival pink. Able to hear voice at normal amplitude   Lungs:      Visibly symmetric chest expansion, No palpable tenderness. Clear to auscultation bilaterally. Heart[de-identified]    Regular rate and rhythm, S1, S2 normal, no click, rub or gallop. No JVD, Normal palpable peripheral pulses. No cyanosis. MSM III/VI at SB   Abdomen:     Soft, non-tender. Bowel sounds normal. No masses,  No      organomegaly. Extremities:   Extremities normal, atraumatic, mild edema. Neurologic:   CN II-XII grossly intact.  No gross focal deficits         Recent Labs:  Lab Results   Component Value Date/Time    Cholesterol, total 219 (H) 09/14/2019 08:12 AM    HDL Cholesterol 64 09/14/2019 08:12 AM    LDL, calculated 141.8 (H) 09/14/2019 08:12 AM Triglyceride 66 09/14/2019 08:12 AM    CHOL/HDL Ratio 3.4 09/14/2019 08:12 AM     Lab Results   Component Value Date/Time    Creatinine 0.81 09/14/2019 08:39 AM     Lab Results   Component Value Date/Time    BUN 9 09/14/2019 08:39 AM     Lab Results   Component Value Date/Time    Potassium 4.1 09/14/2019 08:39 AM     Lab Results   Component Value Date/Time    Hemoglobin A1c 5.6 09/14/2019 08:12 AM     Lab Results   Component Value Date/Time    HGB 12.2 09/14/2019 08:12 AM     Lab Results   Component Value Date/Time    PLATELET 850 (L) 26/18/8973 08:12 AM       Reviewed:  Past Medical History:   Diagnosis Date    Arthritis     Asthma     CAD (coronary artery disease)     Cancer ([facility] [address]     Prostate    Chronic obstructive pulmonary disease (HCC)     Chronic pain     Essential hypertension     Pt denies    Hyperlipidemia      Social History     Tobacco Use   Smoking Status Never Smoker   Smokeless Tobacco Never Used     Social History     Substance and Sexual Activity   Alcohol Use Not Currently    Alcohol/week: 0.0 standard drinks    Comment: occasional drink at night 2-3 times per week     Allergies   Allergen Reactions    Codeine Nausea and Vomiting    Lipitor [Atorvastatin] Other (comments)     Causes g.i. upset    Sulfa (Sulfonamide Antibiotics) Nausea Only       Current Outpatient Medications   Medication Sig    traMADol (ULTRAM) 50 mg tablet Take 50 mg by mouth every six (6) hours as needed for Pain.  mometasone (ASMANEX TWISTHALER) 220 mcg (120 doses) aepb inhaler Take  by inhalation every evening.  bicalutamide (CASODEX) 50 mg tablet Take 50 mg by mouth daily.  albuterol (PROVENTIL HFA, VENTOLIN HFA, PROAIR HFA) 90 mcg/actuation inhaler Take 1 Puff by inhalation two (2) times daily as needed for Wheezing.  oxyCODONE IR (ROXICODONE) 5 mg immediate release tablet Take 5 mg by mouth nightly.  calcium citrate-vitamin D3 (CITRACAL + D) tablet Take  by mouth two (2) times a day.     simvastatin (ZOCOR) 10 mg tablet TAKE ONE TABLET BY MOUTH EVERY NIGHT AT BEDTIME    beclomethasone (QVAR) 40 mcg/actuation inhaler Take 2 Puffs by inhalation two (2) times a day. No current facility-administered medications for this visit.         Gulshan Dunlap MD  [employer] [facility]  [address] [facility] [address]  [facility], [address]

## 2020-09-28 NOTE — LETTER
Patient:  Renee Burns. YOB: 1931 Date of Visit: 9/28/2020 Dear Jeanne Hamilton MD 
[address] [facility] [address] VIA Facsimile: [phone] [facility], [address] [address] VIA In Basket: 
 
Mr. Maritza Hartley is a 81 yo M with CAD s/p cath on 3/19/10 with multivessel CAD, then 5 vessel CABG on 3/23/10 (LIMA to LAD, SVG to D1, OM2, OM3, and SVG to RCA), preserved EF 60% by echo 2010, hyperlipidemia (intolerance to lipitor) on Simvastatin, asthma, DJD of lumbar spine, sciatica on right side followed by orthopedics, Dr. Kathy Leonard; s/p surgery on his right hip on 03/27/2017. This past year, he has been dealing with stage 4 prostate CA that spread to his spine and his back. He sees Dr. Parish Duncan for this. They talked about different therapies but he is not interested in chemo or radiation therapy. From a symptom standpoint, he has been having chest pains, sharp pain that is worse with deep breath, positional if he turns a certain way. When he is laying down at rest he does not have pain. He has baseline shortness of breath with exertion and this is unchanged. He did say he had a CT of the chest 2 weeks ago but he has not received the results of this. He does take tramadol for his back and spine; this also helps with his chest pain. He denies any palpitations, lightheadedness/dizziness. He is compensated on exam with no rales, some coarse breath sounds related to his COPD, trace LE edema. BP elevated here but has been normal at home. He is accompanied by his wife. Assessment and Plan: 1. CAD s/p CABG 2010. His cardiac cath in 2019 demonstrated patent bypass grafts. His chest pains now are non cardiac; positional, somewhat pleuritic. With his prostate CA would be concerned about possible metastasis or pulmonary involvement.  He had a recent CT scan is going to follow up with his primary care. He was on imdur in the past, but he wants to hold off on restarting this until after he sees his primary care. With regards to his both his aspirin and statin, and status of his prostate CA, I do think the benefits do not outweigh the risk and would not restart a statin or aspirin. 2. Prostate CA, stage 4. Followed by Dr. Juan Parker. 3. Code status. He has not had discussions about this or advanced directives. Discussed this with patient and his spouse at length. He says that he has lived a good life and do not resuscitate seems most appropriate. He is will see his primary care to sort this out. 4. Dizziness. Followed by neurology and ENT. No obvious cardiac contribution 5. COPD. If you have questions, please do not hesitate to call me. Sincerely, José Miguel Alexander MD

## 2020-09-29 ENCOUNTER — PATIENT OUTREACH (OUTPATIENT)
Dept: CASE MANAGEMENT | Age: 85
End: 2020-09-29

## 2020-09-29 DIAGNOSIS — C61 PROSTATE CA (HCC): Primary | ICD-10-CM

## 2020-09-29 DIAGNOSIS — I25.118 CORONARY ARTERY DISEASE OF NATIVE ARTERY OF NATIVE HEART WITH STABLE ANGINA PECTORIS (HCC): ICD-10-CM

## 2020-09-29 NOTE — PROGRESS NOTES
9/29/29    Per request Dr. Floyd Camargo - and after discussion in conjunction with office visit -   Referral for ACP team to contact pt for assist in completion of Advanced Directives -   Matthew Trotter

## 2020-10-22 ENCOUNTER — TELEPHONE (OUTPATIENT)
Dept: CASE MANAGEMENT | Age: 85
End: 2020-10-22

## 2020-10-22 NOTE — TELEPHONE ENCOUNTER
ACP referral received. Post reviewing chart, I contact pt. Wife spoke with me and agreed for ACP information be sent to their eimail for review. Will follow up next week. Pt's contact info is current.   Timi Henderson LCSW

## 2020-11-03 ENCOUNTER — TELEPHONE (OUTPATIENT)
Dept: CASE MANAGEMENT | Age: 85
End: 2020-11-03

## 2020-11-03 NOTE — TELEPHONE ENCOUNTER
2nd ACP follow up attempt today. Left message on wife's mobile. Will follow up next week.   Evelia Canavan, LCSW

## 2020-11-12 ENCOUNTER — TELEPHONE (OUTPATIENT)
Dept: CASE MANAGEMENT | Age: 85
End: 2020-11-12

## 2020-11-12 NOTE — TELEPHONE ENCOUNTER
Spoke with wife this am. She stated her daughter is taking care of this. I suggested if pt completed an AMD to please have it scanned into his chart.   Will close referral.  Leela Snell LCSW

## 2021-01-17 ENCOUNTER — APPOINTMENT (OUTPATIENT)
Dept: CT IMAGING | Age: 86
End: 2021-01-17
Attending: EMERGENCY MEDICINE
Payer: MEDICARE

## 2021-01-17 ENCOUNTER — APPOINTMENT (OUTPATIENT)
Dept: GENERAL RADIOLOGY | Age: 86
End: 2021-01-17
Attending: EMERGENCY MEDICINE
Payer: MEDICARE

## 2021-01-17 ENCOUNTER — HOSPITAL ENCOUNTER (EMERGENCY)
Age: 86
Discharge: HOME | End: 2021-01-17
Attending: EMERGENCY MEDICINE
Payer: MEDICARE

## 2021-01-17 VITALS
SYSTOLIC BLOOD PRESSURE: 176 MMHG | HEART RATE: 74 BPM | RESPIRATION RATE: 18 BRPM | OXYGEN SATURATION: 95 % | WEIGHT: 136.47 LBS | BODY MASS INDEX: 21.93 KG/M2 | TEMPERATURE: 97.9 F | HEIGHT: 66 IN | DIASTOLIC BLOOD PRESSURE: 78 MMHG

## 2021-01-17 DIAGNOSIS — R11.2 NON-INTRACTABLE VOMITING WITH NAUSEA, UNSPECIFIED VOMITING TYPE: ICD-10-CM

## 2021-01-17 DIAGNOSIS — R10.84 ABDOMINAL PAIN, GENERALIZED: Primary | ICD-10-CM

## 2021-01-17 LAB
ANION GAP SERPL CALC-SCNC: 13 MMOL/L (ref 5–15)
ATRIAL RATE: 91 BPM
BASOPHILS # BLD: 0 K/UL (ref 0–0.1)
BASOPHILS NFR BLD: 0 % (ref 0–1)
BUN SERPL-MCNC: 12 MG/DL (ref 6–20)
BUN/CREAT SERPL: 16 (ref 12–20)
CALCIUM SERPL-MCNC: 9.4 MG/DL (ref 8.5–10.1)
CALCULATED P AXIS, ECG09: 44 DEGREES
CALCULATED R AXIS, ECG10: -33 DEGREES
CALCULATED T AXIS, ECG11: 58 DEGREES
CHLORIDE SERPL-SCNC: 89 MMOL/L (ref 97–108)
CO2 SERPL-SCNC: 25 MMOL/L (ref 21–32)
COMMENT, HOLDF: NORMAL
CREAT SERPL-MCNC: 0.76 MG/DL (ref 0.7–1.3)
DIAGNOSIS, 93000: NORMAL
DIFFERENTIAL METHOD BLD: ABNORMAL
EOSINOPHIL # BLD: 0 K/UL (ref 0–0.4)
EOSINOPHIL NFR BLD: 0 % (ref 0–7)
ERYTHROCYTE [DISTWIDTH] IN BLOOD BY AUTOMATED COUNT: 12.6 % (ref 11.5–14.5)
GLUCOSE SERPL-MCNC: 133 MG/DL (ref 65–100)
HCT VFR BLD AUTO: 40.2 % (ref 36.6–50.3)
HGB BLD-MCNC: 13.2 G/DL (ref 12.1–17)
IMM GRANULOCYTES # BLD AUTO: 0 K/UL (ref 0–0.04)
IMM GRANULOCYTES NFR BLD AUTO: 0 % (ref 0–0.5)
LIPASE SERPL-CCNC: 55 U/L (ref 73–393)
LYMPHOCYTES # BLD: 0.6 K/UL (ref 0.8–3.5)
LYMPHOCYTES NFR BLD: 6 % (ref 12–49)
MCH RBC QN AUTO: 27.6 PG (ref 26–34)
MCHC RBC AUTO-ENTMCNC: 32.8 G/DL (ref 30–36.5)
MCV RBC AUTO: 84.1 FL (ref 80–99)
MONOCYTES # BLD: 0.6 K/UL (ref 0–1)
MONOCYTES NFR BLD: 7 % (ref 5–13)
NEUTS SEG # BLD: 8.3 K/UL (ref 1.8–8)
NEUTS SEG NFR BLD: 87 % (ref 32–75)
NRBC # BLD: 0 K/UL (ref 0–0.01)
NRBC BLD-RTO: 0 PER 100 WBC
P-R INTERVAL, ECG05: 128 MS
PLATELET # BLD AUTO: 121 K/UL (ref 150–400)
PMV BLD AUTO: 9.7 FL (ref 8.9–12.9)
POTASSIUM SERPL-SCNC: 3.9 MMOL/L (ref 3.5–5.1)
Q-T INTERVAL, ECG07: 370 MS
QRS DURATION, ECG06: 106 MS
QTC CALCULATION (BEZET), ECG08: 455 MS
RBC # BLD AUTO: 4.78 M/UL (ref 4.1–5.7)
SAMPLES BEING HELD,HOLD: NORMAL
SODIUM SERPL-SCNC: 127 MMOL/L (ref 136–145)
TROPONIN I SERPL-MCNC: <0.05 NG/ML
VENTRICULAR RATE, ECG03: 91 BPM
WBC # BLD AUTO: 9.5 K/UL (ref 4.1–11.1)

## 2021-01-17 PROCEDURE — 96374 THER/PROPH/DIAG INJ IV PUSH: CPT

## 2021-01-17 PROCEDURE — 84484 ASSAY OF TROPONIN QUANT: CPT

## 2021-01-17 PROCEDURE — 71045 X-RAY EXAM CHEST 1 VIEW: CPT

## 2021-01-17 PROCEDURE — 74176 CT ABD & PELVIS W/O CONTRAST: CPT

## 2021-01-17 PROCEDURE — 83690 ASSAY OF LIPASE: CPT

## 2021-01-17 PROCEDURE — 96375 TX/PRO/DX INJ NEW DRUG ADDON: CPT

## 2021-01-17 PROCEDURE — 99285 EMERGENCY DEPT VISIT HI MDM: CPT

## 2021-01-17 PROCEDURE — 80048 BASIC METABOLIC PNL TOTAL CA: CPT

## 2021-01-17 PROCEDURE — 74011250636 HC RX REV CODE- 250/636: Performed by: EMERGENCY MEDICINE

## 2021-01-17 PROCEDURE — 93005 ELECTROCARDIOGRAM TRACING: CPT

## 2021-01-17 PROCEDURE — 74011250637 HC RX REV CODE- 250/637: Performed by: EMERGENCY MEDICINE

## 2021-01-17 PROCEDURE — 85025 COMPLETE CBC W/AUTO DIFF WBC: CPT

## 2021-01-17 RX ORDER — OXYCODONE HYDROCHLORIDE 5 MG/1
5 TABLET ORAL
Status: COMPLETED | OUTPATIENT
Start: 2021-01-17 | End: 2021-01-17

## 2021-01-17 RX ORDER — ONDANSETRON 2 MG/ML
4 INJECTION INTRAMUSCULAR; INTRAVENOUS ONCE
Status: COMPLETED | OUTPATIENT
Start: 2021-01-17 | End: 2021-01-17

## 2021-01-17 RX ORDER — SODIUM CHLORIDE 9 MG/ML
50 INJECTION, SOLUTION INTRAVENOUS
Status: DISCONTINUED | OUTPATIENT
Start: 2021-01-17 | End: 2021-01-17

## 2021-01-17 RX ORDER — ONDANSETRON 4 MG/1
4 TABLET, ORALLY DISINTEGRATING ORAL
Qty: 12 TAB | Refills: 0 | Status: SHIPPED | OUTPATIENT
Start: 2021-01-17

## 2021-01-17 RX ORDER — SODIUM CHLORIDE 0.9 % (FLUSH) 0.9 %
10 SYRINGE (ML) INJECTION
Status: DISCONTINUED | OUTPATIENT
Start: 2021-01-17 | End: 2021-01-17

## 2021-01-17 RX ORDER — MORPHINE SULFATE 2 MG/ML
2 INJECTION, SOLUTION INTRAMUSCULAR; INTRAVENOUS ONCE
Status: COMPLETED | OUTPATIENT
Start: 2021-01-17 | End: 2021-01-17

## 2021-01-17 RX ADMIN — ONDANSETRON 4 MG: 2 INJECTION INTRAMUSCULAR; INTRAVENOUS at 10:43

## 2021-01-17 RX ADMIN — OXYCODONE HYDROCHLORIDE 5 MG: 5 TABLET ORAL at 15:59

## 2021-01-17 RX ADMIN — MORPHINE SULFATE 2 MG: 2 INJECTION, SOLUTION INTRAMUSCULAR; INTRAVENOUS at 13:24

## 2021-01-17 NOTE — ED TRIAGE NOTES
Pt. Chris Kidney by EMS for chest pain and n/v dizziness weakness with hx. Of prostate cancer. Non radiating pain. Pt. States it started yesterday at lunch time and has been vomiting and dry heaving since. Pt. Points to epigastric.

## 2021-01-17 NOTE — ED NOTES
The patient left the Emergency Department alert and oriented and in no acute distress. The patient was encouraged to call or return to the ED for worsening issues or problems and was encouraged to schedule a follow up appointment for continuing care.      The patient verbalized understanding of discharge instructions and prescriptions, all questions were answered. The patient has no further concerns at this time. Patient called his wife for pickup at this time.

## 2021-01-17 NOTE — ED PROVIDER NOTES
Date of Service:  1/17/2021    Patient:  Sherice Savage. Chief Complaint:  Chest pain       HPI:  Sherice Rdz is a 80 y.o.  male who presents for evaluation of chest pain. Patient states that yesterday he started having midepigastric and chest wall discomfort. 6 out of 10 nonradiating discomfort. Associated nausea with dry heaving. No abdominal pain. Patient continues to pass gas. No shortness of breath or cough. History of COPD. No back pain numbness or tingling. History of bypass and other cardiac disease. Otherwise he denies fevers or chills. No Covid exposure.   Main complaint of dry heaving and discomfort when he actually vomits           Past Medical History:   Diagnosis Date    Arthritis     Asthma     CAD (coronary artery disease)     Cancer ([facility] [address]     Prostate    Chronic obstructive pulmonary disease (HCC)     Chronic pain     Essential hypertension     Pt denies    Hyperlipidemia        Past Surgical History:   Procedure Laterality Date    HX CORONARY ARTERY BYPASS GRAFT      CABG x 5    HX GI      Hernia Surgery x 3    HX HEART CATHETERIZATION      HX HEENT      Bilateral Cataract surgery    HX HEENT      Tonsils and Adenoids    HX ORTHOPAEDIC      Bilateral Hip replacement         Family History:   Problem Relation Age of Onset    Heart Disease Mother     Cancer Father        Social History     Socioeconomic History    Marital status:      Spouse name: Not on file    Number of children: Not on file    Years of education: Not on file    Highest education level: Not on file   Occupational History    Not on file   Social Needs    Financial resource strain: Not on file    Food insecurity     Worry: Not on file     Inability: Not on file    Transportation needs     Medical: Not on file     Non-medical: Not on file   Tobacco Use    Smoking status: Never Smoker    Smokeless tobacco: Never Used   Substance and Sexual Activity    Alcohol use: Not Currently Alcohol/week: 0.0 standard drinks     Comment: occasional drink at night 2-3 times per week    Drug use: No    Sexual activity: Not on file   Lifestyle    Physical activity     Days per week: Not on file     Minutes per session: Not on file    Stress: Not on file   Relationships    Social connections     Talks on phone: Not on file     Gets together: Not on file     Attends Mu-ism service: Not on file     Active member of club or organization: Not on file     Attends meetings of clubs or organizations: Not on file     Relationship status: Not on file    Intimate partner violence     Fear of current or ex partner: Not on file     Emotionally abused: Not on file     Physically abused: Not on file     Forced sexual activity: Not on file   Other Topics Concern    Not on file   Social History Narrative    Not on file         ALLERGIES: Codeine, Lipitor [atorvastatin], and Sulfa (sulfonamide antibiotics)    Review of Systems   Constitutional: Negative for fever. HENT: Negative for hearing loss. Eyes: Negative for visual disturbance. Respiratory: Negative for cough and shortness of breath. Cardiovascular: Positive for chest pain. Gastrointestinal: Positive for nausea and vomiting. Negative for abdominal pain, constipation and diarrhea. Genitourinary: Negative for flank pain. Musculoskeletal: Negative for back pain and neck pain. Skin: Negative for rash. Neurological: Negative for dizziness, light-headedness and numbness. Psychiatric/Behavioral: Negative for confusion. Vitals:    01/17/21 1032   BP: (!) 195/94   Resp: 24   Weight: 61.9 kg (136 lb 7.4 oz)   Height: 5' 6\" (1.676 m)            Physical Exam  Vitals signs and nursing note reviewed. Constitutional:       General: He is not in acute distress. Appearance: Normal appearance. He is well-developed. He is not ill-appearing, toxic-appearing or diaphoretic.       Comments: Dry heaving intermittently   HENT:      Head: Normocephalic and atraumatic. Nose: Nose normal.   Eyes:      General: No scleral icterus. Conjunctiva/sclera: Conjunctivae normal.   Neck:      Musculoskeletal: Neck supple. Vascular: No JVD. Trachea: No tracheal deviation. Cardiovascular:      Rate and Rhythm: Normal rate and regular rhythm. Heart sounds: No murmur. Pulmonary:      Effort: Pulmonary effort is normal. No respiratory distress. Breath sounds: Normal breath sounds. No stridor. No wheezing, rhonchi or rales. Abdominal:      General: There is no distension. Palpations: Abdomen is soft. Tenderness: There is no abdominal tenderness. There is no right CVA tenderness, left CVA tenderness or guarding. Musculoskeletal: Normal range of motion. General: No tenderness or deformity. Skin:     General: Skin is warm and dry. Capillary Refill: Capillary refill takes less than 2 seconds. Findings: No bruising or rash. Neurological:      Mental Status: He is alert and oriented to person, place, and time. Sensory: No sensory deficit. Psychiatric:         Mood and Affect: Mood normal.         Behavior: Behavior normal.          MDM  Number of Diagnoses or Management Options        VITAL SIGNS:  Patient Vitals for the past 4 hrs:   Temp Pulse Resp BP SpO2   01/17/21 1545 97.9 °F (36.6 °C) 74 18 (!) 176/78    01/17/21 1500  78 18 (!) 171/71 95 %   01/17/21 1415   18 (!) 165/81    01/17/21 1330  81 18 (!) 157/75 98 %   01/17/21 1315 97.8 °F (36.6 °C) 80 18  99 %         LABS:  Recent Results (from the past 6 hour(s))   SAMPLES BEING HELD    Collection Time: 01/17/21 10:44 AM   Result Value Ref Range    SAMPLES BEING HELD 1RED     COMMENT        Add-on orders for these samples will be processed based on acceptable specimen integrity and analyte stability, which may vary by analyte.    CBC WITH AUTOMATED DIFF    Collection Time: 01/17/21 10:44 AM   Result Value Ref Range    WBC 9.5 4.1 - 11.1 K/uL    RBC 4.78 4.10 - 5.70 M/uL    HGB 13.2 12.1 - 17.0 g/dL    HCT 40.2 36.6 - 50.3 %    MCV 84.1 80.0 - 99.0 FL    MCH 27.6 26.0 - 34.0 PG    MCHC 32.8 30.0 - 36.5 g/dL    RDW 12.6 11.5 - 14.5 %    PLATELET 274 (L) 654 - 400 K/uL    MPV 9.7 8.9 - 12.9 FL    NRBC 0.0 0  WBC    ABSOLUTE NRBC 0.00 0.00 - 0.01 K/uL    NEUTROPHILS 87 (H) 32 - 75 %    LYMPHOCYTES 6 (L) 12 - 49 %    MONOCYTES 7 5 - 13 %    EOSINOPHILS 0 0 - 7 %    BASOPHILS 0 0 - 1 %    IMMATURE GRANULOCYTES 0 0.0 - 0.5 %    ABS. NEUTROPHILS 8.3 (H) 1.8 - 8.0 K/UL    ABS. LYMPHOCYTES 0.6 (L) 0.8 - 3.5 K/UL    ABS. MONOCYTES 0.6 0.0 - 1.0 K/UL    ABS. EOSINOPHILS 0.0 0.0 - 0.4 K/UL    ABS. BASOPHILS 0.0 0.0 - 0.1 K/UL    ABS. IMM. GRANS. 0.0 0.00 - 0.04 K/UL    DF AUTOMATED     METABOLIC PANEL, BASIC    Collection Time: 01/17/21 10:44 AM   Result Value Ref Range    Sodium 127 (L) 136 - 145 mmol/L    Potassium 3.9 3.5 - 5.1 mmol/L    Chloride 89 (L) 97 - 108 mmol/L    CO2 25 21 - 32 mmol/L    Anion gap 13 5 - 15 mmol/L    Glucose 133 (H) 65 - 100 mg/dL    BUN 12 6 - 20 MG/DL    Creatinine 0.76 0.70 - 1.30 MG/DL    BUN/Creatinine ratio 16 12 - 20      GFR est AA >60 >60 ml/min/1.73m2    GFR est non-AA >60 >60 ml/min/1.73m2    Calcium 9.4 8.5 - 10.1 MG/DL   TROPONIN I    Collection Time: 01/17/21 10:44 AM   Result Value Ref Range    Troponin-I, Qt. <0.05 <0.05 ng/mL   LIPASE    Collection Time: 01/17/21 10:44 AM   Result Value Ref Range    Lipase 55 (L) 73 - 393 U/L        IMAGING:  CT ABD PELV WO CONT   Final Result   IMPRESSION:    1. No obstruction, ileus or perforation. No intra-abdominal abscess. 2. Sclerotic osseous lesions, as described above and worrisome for osseous   metastatic disease. 3. Cholelithiasis. No evidence of acute cholecystitis. XR CHEST PORT   Final Result   IMPRESSION: Very mild bibasilar linear scarring.                 Medications During Visit:  Medications   ondansetron (ZOFRAN) injection 4 mg (4 mg IntraVENous Given 1/17/21 1043)   morphine injection 2 mg (2 mg IntraVENous Given 1/17/21 1324)   oxyCODONE IR (ROXICODONE) tablet 5 mg (5 mg Oral Given 1/17/21 4259)         DECISION MAKING:  Yrn Garcia is a 80 y.o. male who comes in as above. Diagnostics as above. Patient had some resolution of discomfort with pain medicine. Later he notes that he is due for one of his pain pills so was provided here. Patient CT imaging is discussed with him. Patient notes a history of metastatic cancer into his bones and he is well aware of this issue, noting that he is scheduled for another PET scan next month. I offered the patient admission for further evaluation and treatment of his discomfort to which he responded no he would rather go home. As the patient does not really have chest pain more of some generalized abdominal discomfort in the presence of unremarkable work-up we will discharge the patient home. A CT somewhat limited because of the lack of IV contrast but we are unable to get an additional IV and he requested us to stop it at this time patient appears well, he is hemodynamically stable. Again observation status is after the but declined. No AMA paperwork as shared decision-making was used in this decision. He agrees to return as needed. All questions answered      IMPRESSION:  1. Abdominal pain, generalized    2. Non-intractable vomiting with nausea, unspecified vomiting type        DISPOSITION:  Discharged      Discharge Medication List as of 1/17/2021  3:48 PM      START taking these medications    Details   ondansetron (ZOFRAN ODT) 4 mg disintegrating tablet Take 1 Tab by mouth every eight (8) hours as needed for Nausea for up to 12 doses. , Normal, Disp-12 Tab, R-0         CONTINUE these medications which have NOT CHANGED    Details   traMADol (ULTRAM) 50 mg tablet Take 50 mg by mouth every six (6) hours as needed for Pain., Historical Med      oxyCODONE IR (ROXICODONE) 5 mg immediate release tablet Take 5 mg by mouth nightly., Historical Med      mometasone (ASMANEX TWISTHALER) 220 mcg (120 doses) aepb inhaler Take  by inhalation every evening., Historical Med      bicalutamide (CASODEX) 50 mg tablet Take 50 mg by mouth daily. , Historical Med      calcium citrate-vitamin D3 (CITRACAL + D) tablet Take  by mouth two (2) times a day., Historical Med      albuterol (PROVENTIL HFA, VENTOLIN HFA, PROAIR HFA) 90 mcg/actuation inhaler Take 1 Puff by inhalation two (2) times daily as needed for Wheezing., Historical Med      beclomethasone (QVAR) 40 mcg/actuation inhaler Take 2 Puffs by inhalation two (2) times a day., Historical Med      simvastatin (ZOCOR) 10 mg tablet TAKE ONE TABLET BY MOUTH EVERY NIGHT AT BEDTIME, Normal, Disp-90 Tab, R-3              Follow-up Information     Follow up With Specialties Details Why Contact Info    Dara Byrnes MD Family Medicine Schedule an appointment as soon as possible for a visit   [address] [place] [address] Emergency Medicine Go to  If symptoms worsen, As needed [phone]            The patient is asked to follow-up with their primary care provider in the next several days. They are to call tomorrow for an appointment. The patient is asked to return promptly for any increased concerns or worsening of symptoms. They can return to this emergency department or any other emergency department.       Procedures

## 2021-02-26 ENCOUNTER — HOSPITAL ENCOUNTER (EMERGENCY)
Age: 86
Discharge: HOME | End: 2021-02-26
Attending: EMERGENCY MEDICINE
Payer: MEDICARE

## 2021-02-26 VITALS
HEART RATE: 100 BPM | RESPIRATION RATE: 20 BRPM | SYSTOLIC BLOOD PRESSURE: 132 MMHG | OXYGEN SATURATION: 94 % | TEMPERATURE: 97.2 F | BODY MASS INDEX: 21.88 KG/M2 | DIASTOLIC BLOOD PRESSURE: 82 MMHG | WEIGHT: 135.58 LBS

## 2021-02-26 DIAGNOSIS — T83.511A URINARY TRACT INFECTION ASSOCIATED WITH INDWELLING URETHRAL CATHETER, INITIAL ENCOUNTER (HCC): Primary | ICD-10-CM

## 2021-02-26 DIAGNOSIS — R10.2 SUPRAPUBIC ABDOMINAL PAIN: ICD-10-CM

## 2021-02-26 DIAGNOSIS — N39.0 URINARY TRACT INFECTION ASSOCIATED WITH INDWELLING URETHRAL CATHETER, INITIAL ENCOUNTER (HCC): Primary | ICD-10-CM

## 2021-02-26 DIAGNOSIS — Z86.19 HISTORY OF ESBL KLEBSIELLA PNEUMONIAE INFECTION: ICD-10-CM

## 2021-02-26 DIAGNOSIS — T83.091A OBSTRUCTION OF FOLEY CATHETER, INITIAL ENCOUNTER (HCC): ICD-10-CM

## 2021-02-26 LAB
APPEARANCE UR: ABNORMAL
BACTERIA URNS QL MICRO: ABNORMAL /HPF
BILIRUB UR QL: NEGATIVE
COLOR UR: ABNORMAL
EPITH CASTS URNS QL MICRO: ABNORMAL /LPF
GLUCOSE UR STRIP.AUTO-MCNC: NEGATIVE MG/DL
HGB UR QL STRIP: ABNORMAL
KETONES UR QL STRIP.AUTO: NEGATIVE MG/DL
LEUKOCYTE ESTERASE UR QL STRIP.AUTO: ABNORMAL
NITRITE UR QL STRIP.AUTO: POSITIVE
PH UR STRIP: 6 [PH] (ref 5–8)
PROT UR STRIP-MCNC: 100 MG/DL
RBC #/AREA URNS HPF: >100 /HPF (ref 0–5)
SP GR UR REFRACTOMETRY: 1.02 (ref 1–1.03)
UR CULT HOLD, URHOLD: NORMAL
UROBILINOGEN UR QL STRIP.AUTO: 1 EU/DL (ref 0.2–1)
WBC URNS QL MICRO: >100 /HPF (ref 0–4)

## 2021-02-26 PROCEDURE — 87077 CULTURE AEROBIC IDENTIFY: CPT

## 2021-02-26 PROCEDURE — 81001 URINALYSIS AUTO W/SCOPE: CPT

## 2021-02-26 PROCEDURE — 87086 URINE CULTURE/COLONY COUNT: CPT

## 2021-02-26 PROCEDURE — 74011250637 HC RX REV CODE- 250/637: Performed by: EMERGENCY MEDICINE

## 2021-02-26 PROCEDURE — 87186 SC STD MICRODIL/AGAR DIL: CPT

## 2021-02-26 PROCEDURE — 99284 EMERGENCY DEPT VISIT MOD MDM: CPT

## 2021-02-26 RX ORDER — LEVOFLOXACIN 500 MG/1
500 TABLET, FILM COATED ORAL
Status: COMPLETED | OUTPATIENT
Start: 2021-02-26 | End: 2021-02-26

## 2021-02-26 RX ORDER — LEVOFLOXACIN 500 MG/1
500 TABLET, FILM COATED ORAL DAILY
Qty: 7 TAB | Refills: 0 | Status: SHIPPED | OUTPATIENT
Start: 2021-02-26 | End: 2021-03-05

## 2021-02-26 RX ADMIN — LEVOFLOXACIN 500 MG: 500 TABLET, FILM COATED ORAL at 21:42

## 2021-02-27 NOTE — ED NOTES
Pt d/c'd home. No IV. Pt medicated per MD orders. Pt given d/c instructions and rx x1 was sent to local pharmacy. Pt and family aware. Pt verbalized understanding. Declined w/c and left ambulatory in care of wife in stable condition.

## 2021-02-27 NOTE — ED NOTES
Bedside and Verbal shift change report given to Kassandra Robertson RN  (oncoming nurse) by Trenton Mendieta RN  (offgoing nurse). Report included the following information SBAR and ED Summary.

## 2021-02-27 NOTE — ED TRIAGE NOTES
Triage Note: Patient arrives to ER complaining of decreased urinary output from his cruz. Per wife patient has not had output from his cruz since early afternoon. States this is a frequent problem. Also, complains of urinary burning.

## 2021-02-27 NOTE — ED PROVIDER NOTES
75-year-old male with significant past medical history of prostate cancer with chronic indwelling Conrad followed by urology. Patient reports having some discomfort in the suprapubic region for the last several days and this afternoon had no urine output into the Conrad. No report of any fevers or chills no flank pain. No nausea or vomiting. Patient normally having his Conrad changed every 3 weeks but was unable to get it changed last week due to scheduling issues. Patient not currently on any antibiotics. History of ESBL.            Past Medical History:   Diagnosis Date    Arthritis     Asthma     CAD (coronary artery disease)     Cancer ([facility] [address]     Prostate    Chronic obstructive pulmonary disease (HCC)     Chronic pain     Essential hypertension     Pt denies    Hyperlipidemia        Past Surgical History:   Procedure Laterality Date    HX CORONARY ARTERY BYPASS GRAFT      CABG x 5    HX GI      Hernia Surgery x 3    HX HEART CATHETERIZATION      HX HEENT      Bilateral Cataract surgery    HX HEENT      Tonsils and Adenoids    HX ORTHOPAEDIC      Bilateral Hip replacement         Family History:   Problem Relation Age of Onset    Heart Disease Mother     Cancer Father        Social History     Socioeconomic History    Marital status:      Spouse name: Not on file    Number of children: Not on file    Years of education: Not on file    Highest education level: Not on file   Occupational History    Not on file   Social Needs    Financial resource strain: Not on file    Food insecurity     Worry: Not on file     Inability: Not on file    Transportation needs     Medical: Not on file     Non-medical: Not on file   Tobacco Use    Smoking status: Never Smoker    Smokeless tobacco: Never Used   Substance and Sexual Activity    Alcohol use: Not Currently     Alcohol/week: 0.0 standard drinks     Comment: occasional drink at night 2-3 times per week    Drug use: No    Sexual activity: Not on file   Lifestyle    Physical activity     Days per week: Not on file     Minutes per session: Not on file    Stress: Not on file   Relationships    Social connections     Talks on phone: Not on file     Gets together: Not on file     Attends Mosque service: Not on file     Active member of club or organization: Not on file     Attends meetings of clubs or organizations: Not on file     Relationship status: Not on file    Intimate partner violence     Fear of current or ex partner: Not on file     Emotionally abused: Not on file     Physically abused: Not on file     Forced sexual activity: Not on file   Other Topics Concern    Not on file   Social History Narrative    Not on file         ALLERGIES: Codeine, Lipitor [atorvastatin], and Sulfa (sulfonamide antibiotics)    Review of Systems   Constitutional: Negative for chills and fever. HENT: Negative for congestion and sore throat. Eyes: Negative for pain. Respiratory: Negative for shortness of breath. Cardiovascular: Negative for chest pain. Gastrointestinal: Positive for abdominal pain (suprapubic pain). Negative for diarrhea, nausea and vomiting. Genitourinary: Positive for difficulty urinating, dysuria and urgency. Negative for flank pain. Musculoskeletal: Negative for back pain and neck pain. Skin: Negative for rash. Neurological: Negative for dizziness and headaches. Vitals:    02/26/21 1945 02/26/21 2105   BP: (!) 192/105 132/82   Pulse: 100    Resp: 20    Temp: 97.2 °F (36.2 °C)    SpO2: 97%    Weight: 61.5 kg (135 lb 9.3 oz)             Physical Exam  Constitutional:       Appearance: He is well-developed. HENT:      Head: Normocephalic. Eyes:      Conjunctiva/sclera: Conjunctivae normal.   Neck:      Musculoskeletal: Normal range of motion and neck supple. Cardiovascular:      Rate and Rhythm: Normal rate and regular rhythm. Pulmonary:      Effort: Pulmonary effort is normal. No respiratory distress. Breath sounds: Normal breath sounds. Abdominal:      General: Abdomen is flat. Bowel sounds are normal. There is distension (suprapubic region only). Palpations: Abdomen is soft. Tenderness: There is abdominal tenderness in the suprapubic area. There is no right CVA tenderness, left CVA tenderness, guarding or rebound. Negative signs include Sandoval's sign and McBurney's sign. Musculoskeletal: Normal range of motion. Skin:     General: Skin is warm. Capillary Refill: Capillary refill takes less than 2 seconds. Findings: No rash. Neurological:      Mental Status: He is alert and oriented to person, place, and time. Comments: No gross motor or sensory deficits          MDM  Number of Diagnoses or Management Options  History of ESBL Klebsiella pneumoniae infection  Obstruction of Conrad catheter, initial encounter ([facility]  Suprapubic abdominal pain  Urinary tract infection associated with indwelling urethral catheter, initial encounter [facility])  Diagnosis management comments: With history of prostate cancer with chronic indwelling Conrad with blocked Conrad causing urinary retention. Patient also has history of ESBL Klebsiella pneumonia urinary tract infection. Sensitive to Levaquin. We will send urine culture and start patient on Levaquin due to signs of UTI at this time. Advised patient follow-up with his urologist.    Discussed the discharge impression and any labs and the results with the patient. Answered any questions and addressed any concerns. Discussed the importance of following up with their primary care provider and/or specialist.  Discussed signs or symptoms that would warrant return back to the ER for further evaluation. The patient is agreeable with discharge.          Amount and/or Complexity of Data Reviewed  Clinical lab tests: reviewed      ED Course as of Feb 26 2105   Fri Feb 26, 2021   2100 Nitrites(!): Positive [ZD]   2101 Blood(!): LARGE [ZD]   2101 Leukocyte Esterase(!): LARGE [ZD]      ED Course User Index  [ZD] Anastasia Dhaliwal MD       Procedures

## 2021-03-03 LAB
BACTERIA SPEC CULT: ABNORMAL
BACTERIA SPEC CULT: ABNORMAL
CC UR VC: ABNORMAL
SERVICE CMNT-IMP: ABNORMAL

## 2021-03-23 ENCOUNTER — TELEPHONE (OUTPATIENT)
Dept: CARDIOLOGY CLINIC | Age: 86
End: 2021-03-23

## 2021-05-17 ENCOUNTER — HOSPITAL ENCOUNTER (EMERGENCY)
Age: 86
Discharge: HOME | End: 2021-05-17
Attending: EMERGENCY MEDICINE
Payer: OTHER MISCELLANEOUS

## 2021-05-17 VITALS
BODY MASS INDEX: 20.71 KG/M2 | WEIGHT: 128.31 LBS | HEART RATE: 85 BPM | RESPIRATION RATE: 16 BRPM | OXYGEN SATURATION: 95 % | TEMPERATURE: 98.9 F | SYSTOLIC BLOOD PRESSURE: 154 MMHG | DIASTOLIC BLOOD PRESSURE: 74 MMHG

## 2021-05-17 DIAGNOSIS — T83.9XXA FOLEY CATHETER PROBLEM, INITIAL ENCOUNTER (HCC): ICD-10-CM

## 2021-05-17 DIAGNOSIS — N30.01 ACUTE CYSTITIS WITH HEMATURIA: ICD-10-CM

## 2021-05-17 DIAGNOSIS — N36.8 URETHRAL BLEEDING: Primary | ICD-10-CM

## 2021-05-17 LAB
APPEARANCE UR: ABNORMAL
BACTERIA URNS QL MICRO: ABNORMAL /HPF
BILIRUB UR QL: NEGATIVE
COLOR UR: ABNORMAL
EPITH CASTS URNS QL MICRO: ABNORMAL /LPF
GLUCOSE UR STRIP.AUTO-MCNC: NEGATIVE MG/DL
HGB UR QL STRIP: ABNORMAL
KETONES UR QL STRIP.AUTO: NEGATIVE MG/DL
LEUKOCYTE ESTERASE UR QL STRIP.AUTO: ABNORMAL
NITRITE UR QL STRIP.AUTO: POSITIVE
PH UR STRIP: 7 [PH] (ref 5–8)
PROT UR STRIP-MCNC: ABNORMAL MG/DL
RBC #/AREA URNS HPF: ABNORMAL /HPF (ref 0–5)
SP GR UR REFRACTOMETRY: 1.01 (ref 1–1.03)
UR CULT HOLD, URHOLD: NORMAL
UROBILINOGEN UR QL STRIP.AUTO: 0.2 EU/DL (ref 0.2–1)
WBC URNS QL MICRO: ABNORMAL /HPF (ref 0–4)

## 2021-05-17 PROCEDURE — 87086 URINE CULTURE/COLONY COUNT: CPT

## 2021-05-17 PROCEDURE — 81001 URINALYSIS AUTO W/SCOPE: CPT

## 2021-05-17 PROCEDURE — 87186 SC STD MICRODIL/AGAR DIL: CPT

## 2021-05-17 PROCEDURE — 99285 EMERGENCY DEPT VISIT HI MDM: CPT

## 2021-05-17 PROCEDURE — 87077 CULTURE AEROBIC IDENTIFY: CPT

## 2021-05-17 RX ORDER — CEPHALEXIN 500 MG/1
500 CAPSULE ORAL 2 TIMES DAILY
Qty: 14 CAP | Refills: 0 | Status: SHIPPED | OUTPATIENT
Start: 2021-05-17 | End: 2021-05-24

## 2021-05-17 NOTE — ED TRIAGE NOTES
Pt arrives via EMS with complaints of hematuria x 24-36 hours. Pt has a cruz in placed. Pt on hospice d/t colon/rectal cancer. CVA five days ago. At baseline per EMS. EMS was called to patient's house yesterday, hospice was called and recommended patient not be transferred and will be out to evaluate. Hospice was not seen patient as of now. Pt wife called EMS again this am and EMS transferred patient.

## 2021-05-17 NOTE — ED PROVIDER NOTES
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 Patient is in hospice and the patient's wife spoke with hospice yesterday and the plan was for them to come evaluate him today, but she called EMS a second time this morning and the decision was made to bring him to the hospital.  No fever or vomiting. No other symptoms aside from some blood coming from the penis. The wife showed some 2 x 2's that had some blood on it to EMS. He is not on any blood thinners they are aware of. He recently had another stroke last week. He has a Conrad catheter, unknown how long its been there.            Past Medical History:   Diagnosis Date    Arthritis     Asthma     CAD (coronary artery disease)     Cancer ([facility] [address]     Prostate    Chronic obstructive pulmonary disease (HCC)     Chronic pain     Essential hypertension     Pt denies    Hyperlipidemia        Past Surgical History:   Procedure Laterality Date    HX CORONARY ARTERY BYPASS GRAFT      CABG x 5    HX GI      Hernia Surgery x 3    HX HEART CATHETERIZATION      HX HEENT      Bilateral Cataract surgery    HX HEENT      Tonsils and Adenoids    HX ORTHOPAEDIC      Bilateral Hip replacement         Family History:   Problem Relation Age of Onset    Heart Disease Mother     Cancer Father        Social History     Socioeconomic History    Marital status:      Spouse name: Not on file    Number of children: Not on file    Years of education: Not on file    Highest education level: Not on file   Occupational History    Not on file   Social Needs    Financial resource strain: Not on file    Food insecurity     Worry: Not on file     Inability: Not on file    Transportation needs     Medical: Not on file     Non-medical: Not on file   Tobacco Use  Smoking status: Never Smoker    Smokeless tobacco: Never Used   Substance and Sexual Activity    Alcohol use: Not Currently     Alcohol/week: 0.0 standard drinks     Comment: occasional drink at night 2-3 times per week    Drug use: No    Sexual activity: Not on file   Lifestyle    Physical activity     Days per week: Not on file     Minutes per session: Not on file    Stress: Not on file   Relationships    Social connections     Talks on phone: Not on file     Gets together: Not on file     Attends Protestant service: Not on file     Active member of club or organization: Not on file     Attends meetings of clubs or organizations: Not on file     Relationship status: Not on file    Intimate partner violence     Fear of current or ex partner: Not on file     Emotionally abused: Not on file     Physically abused: Not on file     Forced sexual activity: Not on file   Other Topics Concern    Not on file   Social History Narrative    Not on file         ALLERGIES: Codeine, Lipitor [atorvastatin], and Sulfa (sulfonamide antibiotics)    Review of Systems   Unable to perform ROS: Patient nonverbal       Vitals:    05/17/21 0739   BP: (!) 148/77   Pulse: 85   Resp: 16   Temp: 98.9 °F (37.2 °C)   SpO2: 96%   Weight: 58.2 kg (128 lb 4.9 oz)            Physical Exam  Constitutional:       Appearance: Normal appearance. HENT:      Head: Normocephalic. Nose: Nose normal.      Mouth/Throat:      Mouth: Mucous membranes are moist.   Eyes:      Extraocular Movements: Extraocular movements intact. Conjunctiva/sclera: Conjunctivae normal.   Cardiovascular:      Rate and Rhythm: Normal rate. Pulmonary:      Effort: Pulmonary effort is normal. No respiratory distress. Abdominal:      General: Abdomen is flat.    Genitourinary:     Comments: Conrad catheter has yellow urine in the bag, with no obvious blood or clots  There is some fairly clear urine coming out of the catheter near the meatus  There is a small amount of blood at the meatus itself, outside the catheter  Musculoskeletal: Normal range of motion. Skin:     Findings: No rash. Neurological:      General: No focal deficit present. Mental Status: He is alert.    Psychiatric:         Behavior: Behavior normal.          MDM  Number of Diagnoses or Management Options  Diagnosis management comments: No clear-cut UTI  No obvious hematuria, seems to be bleeding, likely from Conrad irritation, at the meatus itself  Patient will likely need care for the wound  Plan to get UA and urine culture and discharged back to hospice         Procedures

## 2021-05-17 NOTE — ED NOTES
Tape removed from right upper thigh and New stat lock placed to secure cruz. Wife given discharge instructions. Verbalized understanding. Pt discharged in stable condition. MD reviewed discharge and follow up with patient and wife at bedside.

## 2021-05-19 LAB
BACTERIA SPEC CULT: ABNORMAL
CC UR VC: ABNORMAL
SERVICE CMNT-IMP: ABNORMAL

## 2021-05-19 RX ORDER — CIPROFLOXACIN 500 MG/1
500 TABLET ORAL 2 TIMES DAILY
Qty: 14 TABLET | Refills: 0 | Status: SHIPPED | OUTPATIENT
Start: 2021-05-19 | End: 2021-05-26

## 2021-05-19 NOTE — PROGRESS NOTES
Pt in hospice. I informed wife of results. [address] for cipro 500 mg bid x 7 d to y[facility].   She will discuss with hospice care team

## 2022-03-18 PROBLEM — I25.118 CORONARY ARTERY DISEASE WITH STABLE ANGINA PECTORIS (HCC): Status: ACTIVE | Noted: 2019-05-17

## 2022-03-19 PROBLEM — R11.2 NAUSEA AND VOMITING: Status: ACTIVE | Noted: 2019-09-14

## 2022-03-19 PROBLEM — R42 VERTIGO: Status: ACTIVE | Noted: 2019-09-14

## 2022-03-19 PROBLEM — R33.9 RETENTION OF URINE: Status: ACTIVE | Noted: 2019-09-19

## 2022-03-20 PROBLEM — C61 PROSTATE CANCER (HCC): Status: ACTIVE | Noted: 2019-09-19

## 2023-05-10 RX ORDER — OXYCODONE HYDROCHLORIDE 5 MG/1
5 TABLET ORAL NIGHTLY
COMMUNITY

## 2023-05-10 RX ORDER — THIAMINE HCL 100 MG
TABLET ORAL 2 TIMES DAILY
COMMUNITY

## 2023-05-10 RX ORDER — SIMVASTATIN 10 MG
1 TABLET ORAL NIGHTLY
COMMUNITY
Start: 2018-12-18

## 2023-05-10 RX ORDER — TRAMADOL HYDROCHLORIDE 50 MG/1
50 TABLET ORAL EVERY 6 HOURS PRN
COMMUNITY

## 2023-05-10 RX ORDER — ALBUTEROL SULFATE 90 UG/1
1 AEROSOL, METERED RESPIRATORY (INHALATION) 2 TIMES DAILY PRN
COMMUNITY

## 2023-05-10 RX ORDER — ONDANSETRON 4 MG/1
4 TABLET, ORALLY DISINTEGRATING ORAL EVERY 8 HOURS PRN
COMMUNITY
Start: 2021-01-17

## 2023-05-10 RX ORDER — BICALUTAMIDE 50 MG/1
50 TABLET, FILM COATED ORAL DAILY
COMMUNITY

## 2024-09-11 NOTE — Clinical Note
Catheter inserted. Detail Level: Detailed Quality 226: Preventive Care And Screening: Tobacco Use: Screening And Cessation Intervention: Patient screened for tobacco use and is an ex/non-smoker

## (undated) DEVICE — SYR IRR CATH TIP LR ADPT 70ML -- CONVERT TO ITEM 363120

## (undated) DEVICE — BAG COLLECTION FLD OR-TBL NS --

## (undated) DEVICE — ANGIOGRAPHIC CATHETER: Brand: IMPULSE™

## (undated) DEVICE — INFECTION CONTROL KIT SYS

## (undated) DEVICE — SOLUTION IRRIG 1000ML H2O STRL BLT

## (undated) DEVICE — HI-TORQUE VERSACORE MODIFIED J GUIDE WIRE SYSTEM 260 CM: Brand: HI-TORQUE VERSACORE

## (undated) DEVICE — SOLUTION SCRB 2OZ 10% POVIDONE IOD ANTIMIC BTL

## (undated) DEVICE — CYSTOSCOPY PACK: Brand: CONVERTORS

## (undated) DEVICE — CATHETER PLUG WITH CAP: Brand: DOVER

## (undated) DEVICE — BASIC SINGLE BASIN BTC-LF: Brand: MEDLINE INDUSTRIES, INC.

## (undated) DEVICE — SYR LR LCK 1ML GRAD NSAF 30ML --

## (undated) DEVICE — PACK PROCEDURE SURG HRT CATH

## (undated) DEVICE — CATH DIAG LCB IMPLS 5FRX100CM -- IMPULSE 16391-195

## (undated) DEVICE — GLIDESHEATH SLENDER STAINLESS STEEL KIT: Brand: GLIDESHEATH SLENDER

## (undated) DEVICE — BAG DRNGE 4000ML CONT IRRIG ROUNDED TEARDROP SHP DISP

## (undated) DEVICE — KIT MFLD ISOLATN NACL CNTRST PRT TBNG SPIK W/ PRSS TRNSDUC

## (undated) DEVICE — KIT HND CTRL 3 W STPCOCK ROT END 54IN PREM HI PRSS TBNG AT

## (undated) DEVICE — JELLY,LUBE,STERILE,FLIP TOP,TUBE,4-OZ: Brand: MEDLINE

## (undated) DEVICE — SOLUTION IRRIG 3000ML 0.9% SOD CHL FLX CONT 0797208] ICU MEDICAL INC]

## (undated) DEVICE — SPECIAL PROCEDURE DRAPE 32" X 34": Brand: SPECIAL PROCEDURE DRAPE

## (undated) DEVICE — 4-PORT MANIFOLD: Brand: NEPTUNE 2

## (undated) DEVICE — DRAPE SHT 3 QTR PROXIMA 53X77 --

## (undated) DEVICE — TUBING, SUCTION, 1/4" X 10', STRAIGHT: Brand: MEDLINE

## (undated) DEVICE — BULB SYRINGE, IRRIGATION WITH PROTECTIVE CAP, 60 CC, INDIVIDUALLY WRAPPED: Brand: DOVER

## (undated) DEVICE — CUTTING ELECTRODE BIPO 24FR 12/30°  FOR RESECTOSCOPES, TELESCOPE Ø 4MM, FOR SHEATHS, INTERMITTENT/CONTINUOUS IRRIGATION, 24/26, FR, LOOP: ROUND, WIRE Ø 0.3MM, FORK COLOR BLUE, STEM COLOR BLUE, PACK=3 PCS, FOR SHARK AND S-LINE RESECTOSCOPES, STERILE, FOR SINGLE USE: Brand: SHARK/S-LINE

## (undated) DEVICE — STERILE POLYISOPRENE POWDER-FREE SURGICAL GLOVES: Brand: PROTEXIS

## (undated) DEVICE — STRAP,POSITIONING,KNEE/BODY,FOAM,4X60": Brand: MEDLINE

## (undated) DEVICE — GOWN,SIRUS,NONRNF,SETINSLV,XL,20/CS: Brand: MEDLINE

## (undated) DEVICE — SPONGE GZ W4XL4IN COT 12 PLY TYP VII WVN C FLD DSGN

## (undated) DEVICE — HANDLE LT SNAP ON ULT DURABLE LENS FOR TRUMPF ALC DISPOSABLE

## (undated) DEVICE — KIT MED IMAG CNTRST AGNT W/ IOPAMIDOL REUSE

## (undated) DEVICE — CATH DIAG IMPLS FL4 5FRX100CM -- IMPULSE 16391-22

## (undated) DEVICE — REM POLYHESIVE ADULT PATIENT RETURN ELECTRODE: Brand: VALLEYLAB

## (undated) DEVICE — GARMENT,MEDLINE,DVT,INT,CALF,MED, GEN2: Brand: MEDLINE

## (undated) DEVICE — CATHETER URETH 24FR BLLN 30CC STD LTX 3 W TWO OPP DRNGE EYE